# Patient Record
Sex: FEMALE | HISPANIC OR LATINO | Employment: OTHER | URBAN - METROPOLITAN AREA
[De-identification: names, ages, dates, MRNs, and addresses within clinical notes are randomized per-mention and may not be internally consistent; named-entity substitution may affect disease eponyms.]

---

## 2017-11-01 ENCOUNTER — RECORDS - HEALTHEAST (OUTPATIENT)
Dept: LAB | Facility: CLINIC | Age: 82
End: 2017-11-01

## 2017-11-01 LAB
CHOLEST SERPL-MCNC: 179 MG/DL
FASTING STATUS PATIENT QL REPORTED: ABNORMAL
HDLC SERPL-MCNC: 57 MG/DL
LDLC SERPL CALC-MCNC: 88 MG/DL
TRIGL SERPL-MCNC: 168 MG/DL

## 2019-01-07 ENCOUNTER — RECORDS - HEALTHEAST (OUTPATIENT)
Dept: LAB | Facility: CLINIC | Age: 84
End: 2019-01-07

## 2019-01-07 LAB
ANION GAP SERPL CALCULATED.3IONS-SCNC: 13 MMOL/L (ref 5–18)
BUN SERPL-MCNC: 23 MG/DL (ref 8–28)
CALCIUM SERPL-MCNC: 9.9 MG/DL (ref 8.5–10.5)
CHLORIDE BLD-SCNC: 99 MMOL/L (ref 98–107)
CO2 SERPL-SCNC: 23 MMOL/L (ref 22–31)
CREAT SERPL-MCNC: 1.08 MG/DL (ref 0.6–1.1)
GFR SERPL CREATININE-BSD FRML MDRD: 48 ML/MIN/1.73M2
GLUCOSE BLD-MCNC: 101 MG/DL (ref 70–125)
POTASSIUM BLD-SCNC: 4.3 MMOL/L (ref 3.5–5)
SODIUM SERPL-SCNC: 135 MMOL/L (ref 136–145)
TSH SERPL DL<=0.005 MIU/L-ACNC: 0.78 UIU/ML (ref 0.3–5)

## 2019-06-21 ENCOUNTER — RECORDS - HEALTHEAST (OUTPATIENT)
Dept: LAB | Facility: CLINIC | Age: 84
End: 2019-06-21

## 2019-06-21 LAB
ANION GAP SERPL CALCULATED.3IONS-SCNC: 12 MMOL/L (ref 5–18)
BUN SERPL-MCNC: 19 MG/DL (ref 8–28)
CALCIUM SERPL-MCNC: 10.1 MG/DL (ref 8.5–10.5)
CHLORIDE BLD-SCNC: 100 MMOL/L (ref 98–107)
CO2 SERPL-SCNC: 22 MMOL/L (ref 22–31)
CREAT SERPL-MCNC: 0.95 MG/DL (ref 0.6–1.1)
ERYTHROCYTE [SEDIMENTATION RATE] IN BLOOD BY WESTERGREN METHOD: 42 MM/HR (ref 0–20)
GFR SERPL CREATININE-BSD FRML MDRD: 55 ML/MIN/1.73M2
GLUCOSE BLD-MCNC: 100 MG/DL (ref 70–125)
POTASSIUM BLD-SCNC: 4.8 MMOL/L (ref 3.5–5)
SODIUM SERPL-SCNC: 134 MMOL/L (ref 136–145)

## 2020-06-05 ENCOUNTER — RECORDS - HEALTHEAST (OUTPATIENT)
Dept: LAB | Facility: CLINIC | Age: 85
End: 2020-06-05

## 2020-06-05 LAB
ANION GAP SERPL CALCULATED.3IONS-SCNC: 10 MMOL/L (ref 5–18)
BUN SERPL-MCNC: 22 MG/DL (ref 8–28)
CALCIUM SERPL-MCNC: 9.6 MG/DL (ref 8.5–10.5)
CHLORIDE BLD-SCNC: 100 MMOL/L (ref 98–107)
CHOLEST SERPL-MCNC: 199 MG/DL
CO2 SERPL-SCNC: 24 MMOL/L (ref 22–31)
CREAT SERPL-MCNC: 1.22 MG/DL (ref 0.6–1.1)
FASTING STATUS PATIENT QL REPORTED: ABNORMAL
GFR SERPL CREATININE-BSD FRML MDRD: 41 ML/MIN/1.73M2
GLUCOSE BLD-MCNC: 122 MG/DL (ref 70–125)
HDLC SERPL-MCNC: 54 MG/DL
LDLC SERPL CALC-MCNC: 110 MG/DL
POTASSIUM BLD-SCNC: 4.5 MMOL/L (ref 3.5–5)
SODIUM SERPL-SCNC: 134 MMOL/L (ref 136–145)
TRIGL SERPL-MCNC: 176 MG/DL

## 2021-05-03 ENCOUNTER — RECORDS - HEALTHEAST (OUTPATIENT)
Dept: LAB | Facility: CLINIC | Age: 86
End: 2021-05-03

## 2021-05-03 LAB
ANION GAP SERPL CALCULATED.3IONS-SCNC: 11 MMOL/L (ref 5–18)
BUN SERPL-MCNC: 22 MG/DL (ref 8–28)
CALCIUM SERPL-MCNC: 8.9 MG/DL (ref 8.5–10.5)
CHLORIDE BLD-SCNC: 101 MMOL/L (ref 98–107)
CHOLEST SERPL-MCNC: 184 MG/DL
CO2 SERPL-SCNC: 22 MMOL/L (ref 22–31)
CREAT SERPL-MCNC: 1.22 MG/DL (ref 0.6–1.1)
FASTING STATUS PATIENT QL REPORTED: ABNORMAL
GFR SERPL CREATININE-BSD FRML MDRD: 41 ML/MIN/1.73M2
GLUCOSE BLD-MCNC: 97 MG/DL (ref 70–125)
HDLC SERPL-MCNC: 51 MG/DL
LDLC SERPL CALC-MCNC: 99 MG/DL
POTASSIUM BLD-SCNC: 4.4 MMOL/L (ref 3.5–5)
SODIUM SERPL-SCNC: 134 MMOL/L (ref 136–145)
TRIGL SERPL-MCNC: 171 MG/DL
TSH SERPL DL<=0.005 MIU/L-ACNC: 0.72 UIU/ML (ref 0.3–5)

## 2021-05-26 ENCOUNTER — RECORDS - HEALTHEAST (OUTPATIENT)
Dept: ADMINISTRATIVE | Facility: CLINIC | Age: 86
End: 2021-05-26

## 2021-06-05 ENCOUNTER — RECORDS - HEALTHEAST (OUTPATIENT)
Dept: SCHEDULING | Facility: CLINIC | Age: 86
End: 2021-06-05

## 2021-06-05 DIAGNOSIS — R13.10 DYSPHAGIA: ICD-10-CM

## 2022-05-05 ENCOUNTER — LAB REQUISITION (OUTPATIENT)
Dept: LAB | Facility: CLINIC | Age: 87
End: 2022-05-05
Payer: MEDICARE

## 2022-05-05 DIAGNOSIS — E78.1 PURE HYPERGLYCERIDEMIA: ICD-10-CM

## 2022-05-05 DIAGNOSIS — I12.9 HYPERTENSIVE CHRONIC KIDNEY DISEASE WITH STAGE 1 THROUGH STAGE 4 CHRONIC KIDNEY DISEASE, OR UNSPECIFIED CHRONIC KIDNEY DISEASE: ICD-10-CM

## 2022-05-05 LAB
ANION GAP SERPL CALCULATED.3IONS-SCNC: 13 MMOL/L (ref 5–18)
BUN SERPL-MCNC: 18 MG/DL (ref 8–28)
CALCIUM SERPL-MCNC: 9.9 MG/DL (ref 8.5–10.5)
CHLORIDE BLD-SCNC: 98 MMOL/L (ref 98–107)
CHOLEST SERPL-MCNC: 189 MG/DL
CO2 SERPL-SCNC: 23 MMOL/L (ref 22–31)
CREAT SERPL-MCNC: 1.43 MG/DL (ref 0.6–1.1)
GFR SERPL CREATININE-BSD FRML MDRD: 34 ML/MIN/1.73M2
GLUCOSE BLD-MCNC: 78 MG/DL (ref 70–125)
HDLC SERPL-MCNC: 57 MG/DL
LDLC SERPL CALC-MCNC: 103 MG/DL
POTASSIUM BLD-SCNC: 4.9 MMOL/L (ref 3.5–5)
SODIUM SERPL-SCNC: 134 MMOL/L (ref 136–145)
TRIGL SERPL-MCNC: 147 MG/DL

## 2022-05-05 PROCEDURE — 80061 LIPID PANEL: CPT | Mod: ORL | Performed by: FAMILY MEDICINE

## 2022-05-05 PROCEDURE — 80048 BASIC METABOLIC PNL TOTAL CA: CPT | Mod: ORL | Performed by: FAMILY MEDICINE

## 2022-11-16 ENCOUNTER — TRANSFERRED RECORDS (OUTPATIENT)
Dept: HEALTH INFORMATION MANAGEMENT | Facility: CLINIC | Age: 87
End: 2022-11-16

## 2022-11-17 ENCOUNTER — TRANSCRIBE ORDERS (OUTPATIENT)
Dept: OTHER | Age: 87
End: 2022-11-17

## 2022-11-17 DIAGNOSIS — H57.10 EYE PAIN: Primary | ICD-10-CM

## 2022-11-17 DIAGNOSIS — H52.4 PRESBYOPIA: ICD-10-CM

## 2022-12-08 ENCOUNTER — LAB REQUISITION (OUTPATIENT)
Dept: LAB | Facility: CLINIC | Age: 87
End: 2022-12-08
Payer: MEDICARE

## 2022-12-08 DIAGNOSIS — I12.9 HYPERTENSIVE CHRONIC KIDNEY DISEASE WITH STAGE 1 THROUGH STAGE 4 CHRONIC KIDNEY DISEASE, OR UNSPECIFIED CHRONIC KIDNEY DISEASE: ICD-10-CM

## 2022-12-08 LAB
ANION GAP SERPL CALCULATED.3IONS-SCNC: 18 MMOL/L (ref 7–15)
BUN SERPL-MCNC: 28.6 MG/DL (ref 8–23)
CALCIUM SERPL-MCNC: 9.8 MG/DL (ref 8.2–9.6)
CHLORIDE SERPL-SCNC: 101 MMOL/L (ref 98–107)
CREAT SERPL-MCNC: 1.42 MG/DL (ref 0.51–0.95)
DEPRECATED HCO3 PLAS-SCNC: 21 MMOL/L (ref 22–29)
GFR SERPL CREATININE-BSD FRML MDRD: 34 ML/MIN/1.73M2
GLUCOSE SERPL-MCNC: 111 MG/DL (ref 70–99)
POTASSIUM SERPL-SCNC: 4.3 MMOL/L (ref 3.4–5.3)
SODIUM SERPL-SCNC: 140 MMOL/L (ref 136–145)

## 2022-12-08 PROCEDURE — 80048 BASIC METABOLIC PNL TOTAL CA: CPT | Mod: ORL | Performed by: FAMILY MEDICINE

## 2023-01-03 ENCOUNTER — ANESTHESIA EVENT (OUTPATIENT)
Dept: SURGERY | Facility: CLINIC | Age: 88
End: 2023-01-03
Payer: MEDICARE

## 2023-01-04 ENCOUNTER — ANESTHESIA (OUTPATIENT)
Dept: SURGERY | Facility: CLINIC | Age: 88
End: 2023-01-04
Payer: MEDICARE

## 2023-01-04 ENCOUNTER — HOSPITAL ENCOUNTER (OUTPATIENT)
Facility: CLINIC | Age: 88
Discharge: HOME | End: 2023-01-04
Attending: INTERNAL MEDICINE | Admitting: INTERNAL MEDICINE
Payer: MEDICARE

## 2023-01-04 VITALS
TEMPERATURE: 98.6 F | RESPIRATION RATE: 14 BRPM | HEIGHT: 55 IN | OXYGEN SATURATION: 99 % | SYSTOLIC BLOOD PRESSURE: 138 MMHG | DIASTOLIC BLOOD PRESSURE: 61 MMHG | HEART RATE: 63 BPM | WEIGHT: 90 LBS | BODY MASS INDEX: 20.83 KG/M2

## 2023-01-04 LAB — UPPER GI ENDOSCOPY: NORMAL

## 2023-01-04 PROCEDURE — 999N000141 HC STATISTIC PRE-PROCEDURE NURSING ASSESSMENT: Performed by: INTERNAL MEDICINE

## 2023-01-04 PROCEDURE — 272N000001 HC OR GENERAL SUPPLY STERILE: Performed by: INTERNAL MEDICINE

## 2023-01-04 PROCEDURE — 250N000011 HC RX IP 250 OP 636: Performed by: NURSE ANESTHETIST, CERTIFIED REGISTERED

## 2023-01-04 PROCEDURE — 710N000012 HC RECOVERY PHASE 2, PER MINUTE: Performed by: INTERNAL MEDICINE

## 2023-01-04 PROCEDURE — 360N000075 HC SURGERY LEVEL 2, PER MIN: Performed by: INTERNAL MEDICINE

## 2023-01-04 PROCEDURE — 258N000003 HC RX IP 258 OP 636

## 2023-01-04 PROCEDURE — 250N000009 HC RX 250: Performed by: NURSE ANESTHETIST, CERTIFIED REGISTERED

## 2023-01-04 PROCEDURE — 370N000017 HC ANESTHESIA TECHNICAL FEE, PER MIN: Performed by: INTERNAL MEDICINE

## 2023-01-04 PROCEDURE — 250N000020 HC RX IP 250 OP 636 J0585: Performed by: INTERNAL MEDICINE

## 2023-01-04 RX ORDER — LIDOCAINE HYDROCHLORIDE 10 MG/ML
INJECTION, SOLUTION INFILTRATION; PERINEURAL PRN
Status: DISCONTINUED | OUTPATIENT
Start: 2023-01-04 | End: 2023-01-04

## 2023-01-04 RX ORDER — SODIUM CHLORIDE, SODIUM LACTATE, POTASSIUM CHLORIDE, CALCIUM CHLORIDE 600; 310; 30; 20 MG/100ML; MG/100ML; MG/100ML; MG/100ML
INJECTION, SOLUTION INTRAVENOUS CONTINUOUS
Status: DISCONTINUED | OUTPATIENT
Start: 2023-01-04 | End: 2023-01-04 | Stop reason: HOSPADM

## 2023-01-04 RX ORDER — PROPOFOL 10 MG/ML
INJECTION, EMULSION INTRAVENOUS PRN
Status: DISCONTINUED | OUTPATIENT
Start: 2023-01-04 | End: 2023-01-04

## 2023-01-04 RX ORDER — FENTANYL CITRATE 50 UG/ML
25 INJECTION, SOLUTION INTRAMUSCULAR; INTRAVENOUS EVERY 5 MIN PRN
Status: DISCONTINUED | OUTPATIENT
Start: 2023-01-04 | End: 2023-01-04 | Stop reason: HOSPADM

## 2023-01-04 RX ORDER — FENTANYL CITRATE 50 UG/ML
25 INJECTION, SOLUTION INTRAMUSCULAR; INTRAVENOUS
Status: DISCONTINUED | OUTPATIENT
Start: 2023-01-04 | End: 2023-01-04 | Stop reason: HOSPADM

## 2023-01-04 RX ORDER — ONDANSETRON 4 MG/1
4 TABLET, ORALLY DISINTEGRATING ORAL EVERY 30 MIN PRN
Status: DISCONTINUED | OUTPATIENT
Start: 2023-01-04 | End: 2023-01-04 | Stop reason: HOSPADM

## 2023-01-04 RX ORDER — ONDANSETRON 2 MG/ML
4 INJECTION INTRAMUSCULAR; INTRAVENOUS EVERY 30 MIN PRN
Status: DISCONTINUED | OUTPATIENT
Start: 2023-01-04 | End: 2023-01-04 | Stop reason: HOSPADM

## 2023-01-04 RX ORDER — PROPOFOL 10 MG/ML
INJECTION, EMULSION INTRAVENOUS CONTINUOUS PRN
Status: DISCONTINUED | OUTPATIENT
Start: 2023-01-04 | End: 2023-01-04

## 2023-01-04 RX ORDER — HYDROMORPHONE HCL IN WATER/PF 6 MG/30 ML
0.2 PATIENT CONTROLLED ANALGESIA SYRINGE INTRAVENOUS EVERY 5 MIN PRN
Status: DISCONTINUED | OUTPATIENT
Start: 2023-01-04 | End: 2023-01-04 | Stop reason: HOSPADM

## 2023-01-04 RX ORDER — HYDROMORPHONE HCL IN WATER/PF 6 MG/30 ML
0.4 PATIENT CONTROLLED ANALGESIA SYRINGE INTRAVENOUS EVERY 5 MIN PRN
Status: DISCONTINUED | OUTPATIENT
Start: 2023-01-04 | End: 2023-01-04 | Stop reason: HOSPADM

## 2023-01-04 RX ORDER — GLYCOPYRROLATE 0.2 MG/ML
INJECTION, SOLUTION INTRAMUSCULAR; INTRAVENOUS PRN
Status: DISCONTINUED | OUTPATIENT
Start: 2023-01-04 | End: 2023-01-04

## 2023-01-04 RX ORDER — LIDOCAINE 40 MG/G
CREAM TOPICAL
Status: DISCONTINUED | OUTPATIENT
Start: 2023-01-04 | End: 2023-01-04 | Stop reason: HOSPADM

## 2023-01-04 RX ORDER — MEPERIDINE HYDROCHLORIDE 25 MG/ML
12.5 INJECTION INTRAMUSCULAR; INTRAVENOUS; SUBCUTANEOUS
Status: DISCONTINUED | OUTPATIENT
Start: 2023-01-04 | End: 2023-01-04 | Stop reason: HOSPADM

## 2023-01-04 RX ORDER — FENTANYL CITRATE 50 UG/ML
50 INJECTION, SOLUTION INTRAMUSCULAR; INTRAVENOUS EVERY 5 MIN PRN
Status: DISCONTINUED | OUTPATIENT
Start: 2023-01-04 | End: 2023-01-04 | Stop reason: HOSPADM

## 2023-01-04 RX ADMIN — PROPOFOL 150 MCG/KG/MIN: 10 INJECTION, EMULSION INTRAVENOUS at 11:20

## 2023-01-04 RX ADMIN — GLYCOPYRROLATE 0.2 MG: 0.2 INJECTION, SOLUTION INTRAMUSCULAR; INTRAVENOUS at 11:20

## 2023-01-04 RX ADMIN — PROPOFOL 20 MG: 10 INJECTION, EMULSION INTRAVENOUS at 11:20

## 2023-01-04 RX ADMIN — LIDOCAINE HYDROCHLORIDE 2 ML: 10 INJECTION, SOLUTION INFILTRATION; PERINEURAL at 11:18

## 2023-01-04 RX ADMIN — SODIUM CHLORIDE, POTASSIUM CHLORIDE, SODIUM LACTATE AND CALCIUM CHLORIDE: 600; 310; 30; 20 INJECTION, SOLUTION INTRAVENOUS at 10:44

## 2023-01-04 RX ADMIN — PROPOFOL 20 MG: 10 INJECTION, EMULSION INTRAVENOUS at 11:24

## 2023-01-04 RX ADMIN — PROPOFOL 20 MG: 10 INJECTION, EMULSION INTRAVENOUS at 11:22

## 2023-01-04 ASSESSMENT — ACTIVITIES OF DAILY LIVING (ADL): ADLS_ACUITY_SCORE: 37

## 2023-01-04 NOTE — ANESTHESIA PREPROCEDURE EVALUATION
Anesthesia Pre-Procedure Evaluation    Patient: Kim Dunn   MRN: [id number] : 1928        Procedure : Procedure(s):  ESOPHAGOGASTRODUODENOSCOPY WITH BOTOX          Past Medical History:   Diagnosis Date     Gastroesophageal reflux disease      Hypertension       Past Surgical History:   Procedure Laterality Date     ABDOMINAL HYSTERECTOMY       CHOLECYSTECTOMY       ESOPHAGOSCOPY, GASTROSCOPY, DUODENOSCOPY (EGD), COMBINED N/A 4/3/2015    Procedure: UPPER ENDOSCOPY WITH injection of BOTOX;  Surgeon: Loi Alberto MD;  Location: [facility];  Service:       Allergies   Allergen Reactions     Penicillins Hives     Seafood       Social History     Tobacco Use     Smoking status: Never     Smokeless tobacco: Not on file   Substance Use Topics     Alcohol use: No      Wt Readings from Last 1 Encounters:   23 40.8 kg (90 lb)        Anesthesia Evaluation   Pt has had prior anesthetic.     No history of anesthetic complications       ROS/MED HX  ENT/Pulmonary:  - neg pulmonary ROS     Neurologic:  - neg neurologic ROS     Cardiovascular: Comment: Mild AV sclerosis, no hemodynamic changes; EF 69%    (+) hypertension-----    METS/Exercise Tolerance:     Hematologic:       Musculoskeletal: Comment: PMR      GI/Hepatic:     (+) GERD (cough, achalasia),     Renal/Genitourinary:     (+) renal disease, type: CRI,     Endo:  - neg endo ROS     Psychiatric/Substance Use:  - neg psychiatric ROS     Infectious Disease:  - neg infectious disease ROS     Malignancy:       Other:            Physical Exam    Airway        Mallampati: II   TM distance: > 3 FB   Neck ROM: full   Mouth opening: > 3 cm    Respiratory Devices and Support         Dental  no notable dental history         Cardiovascular          Rhythm and rate: regular and normal     Pulmonary           breath sounds clear to auscultation           OUTSIDE LABS:  CBC: No results found for: WBC, HGB, HCT, PLT  BMP:   Lab Results   Component Value Date      12/08/2022     (L) 05/05/2022    POTASSIUM 4.3 12/08/2022    POTASSIUM 4.9 05/05/2022    CHLORIDE 101 12/08/2022    CHLORIDE 98 05/05/2022    CO2 21 (L) 12/08/2022    CO2 23 05/05/2022    BUN 28.6 (H) 12/08/2022    BUN 18 05/05/2022    CR 1.42 (H) 12/08/2022    CR 1.43 (H) 05/05/2022     (H) 12/08/2022    GLC 78 05/05/2022     COAGS: No results found for: PTT, INR, FIBR  POC: No results found for: BGM, HCG, HCGS  HEPATIC: No results found for: ALBUMIN, PROTTOTAL, ALT, AST, GGT, ALKPHOS, BILITOTAL, BILIDIRECT, PRUDENCIO  OTHER:   Lab Results   Component Value Date    NEHA 9.8 (H) 12/08/2022    TSH 0.72 05/03/2021    SED 42 (H) 06/21/2019       Anesthesia Plan    ASA Status:  3      Anesthesia Type: MAC.              Consents    Anesthesia Plan(s) and associated risks, benefits, and realistic alternatives discussed. Questions answered and patient/representative(s) expressed understanding.     - Discussed: Risks, Benefits and Alternatives for BOTH SEDATION and the PROCEDURE were discussed     - Discussed with:  Patient         Postoperative Care            Comments:                Sue Lyons MD

## 2023-01-04 NOTE — ANESTHESIA POSTPROCEDURE EVALUATION
Patient: Kim Dunn    Procedure: Procedure(s):  ESOPHAGOGASTRODUODENOSCOPY WITH BOTOX       Anesthesia Type:  MAC    Note:  Disposition: Outpatient   Postop Pain Control: Uneventful            Sign Out: Well controlled pain   PONV: No   Neuro/Psych: Uneventful            Sign Out: Acceptable/Baseline neuro status   Airway/Respiratory: Uneventful            Sign Out: Acceptable/Baseline resp. status   CV/Hemodynamics: Uneventful            Sign Out: Acceptable CV status; No obvious hypovolemia; No obvious fluid overload   Other NRE: NONE   DID A NON-ROUTINE EVENT OCCUR? No           Last vitals:  Vitals Value Taken Time   /61 01/04/23 1215   Temp 37  C (98.6  F) 01/04/23 1215   Pulse     Resp 14 01/04/23 1215   SpO2 99 % 01/04/23 1215       Electronically Signed By: Myles Silvestre MD  January 4, 2023  3:21 PM   patient

## 2023-01-04 NOTE — ANESTHESIA CARE TRANSFER NOTE
Patient: Kim Dunn    Procedure: Procedure(s):  ESOPHAGOGASTRODUODENOSCOPY WITH BOTOX       Diagnosis: Achalasia [K22.0]  Diagnosis Additional Information: No value filed.    Anesthesia Type:   MAC     Note:    Oropharynx: oropharynx clear of all foreign objects  Level of Consciousness: awake  Oxygen Supplementation: face mask  Level of Supplemental Oxygen (L/min / FiO2): 8  Independent Airway: airway patency satisfactory and stable  Dentition: dentition unchanged  Vital Signs Stable: post-procedure vital signs reviewed and stable  Report to RN Given: handoff report given  Patient transferred to: Phase II    Handoff Report: Identifed the Patient, Identified the Reponsible Provider, Reviewed the pertinent medical history, Discussed the surgical course, Reviewed Intra-OP anesthesia mangement and issues during anesthesia, Set expectations for post-procedure period and Allowed opportunity for questions and acknowledgement of understanding      Vitals:  Vitals Value Taken Time   BP 94/46 01/04/23 1134   Temp 36.1  C (96.9  F) 01/04/23 1134   Pulse 62    Resp 16 01/04/23 1134   SpO2 99 % 01/04/23 1134       Electronically Signed By: ROCHELLE Saldivar CRNA  January 4, 2023  11:37 AM

## 2023-01-12 ENCOUNTER — TELEPHONE (OUTPATIENT)
Dept: OPHTHALMOLOGY | Facility: CLINIC | Age: 88
End: 2023-01-12

## 2023-01-12 NOTE — TELEPHONE ENCOUNTER
Spoke with pt's grandson to reschedule appointment with Dr. Moctezuma. Confirmed new date/time    Monika Marley on 1/12/2023 at 11:24 AM

## 2023-03-08 PROBLEM — R09.89 BRUIT OF RIGHT CAROTID ARTERY: Status: ACTIVE | Noted: 2022-12-14

## 2023-03-08 PROBLEM — I10 PRIMARY HYPERTENSION: Status: ACTIVE | Noted: 2022-12-14

## 2023-03-08 PROBLEM — R29.6 FALLS: Status: ACTIVE | Noted: 2022-12-14

## 2023-03-08 PROBLEM — I45.10 RBBB: Status: ACTIVE | Noted: 2022-12-14

## 2023-03-08 PROBLEM — I35.8 AORTIC VALVE SCLEROSIS: Status: ACTIVE | Noted: 2022-12-14

## 2023-03-09 ENCOUNTER — OFFICE VISIT (OUTPATIENT)
Dept: OPHTHALMOLOGY | Facility: CLINIC | Age: 88
End: 2023-03-09
Attending: OPTOMETRIST
Payer: MEDICARE

## 2023-03-09 DIAGNOSIS — M79.2 NEUROPATHIC PAIN: ICD-10-CM

## 2023-03-09 DIAGNOSIS — H52.4 PRESBYOPIA: ICD-10-CM

## 2023-03-09 DIAGNOSIS — H40.003 GLAUCOMA SUSPECT OF BOTH EYES: Primary | ICD-10-CM

## 2023-03-09 PROCEDURE — 92015 DETERMINE REFRACTIVE STATE: CPT

## 2023-03-09 PROCEDURE — G0463 HOSPITAL OUTPT CLINIC VISIT: HCPCS | Mod: 25

## 2023-03-09 PROCEDURE — 92133 CPTRZD OPH DX IMG PST SGM ON: CPT | Performed by: OPHTHALMOLOGY

## 2023-03-09 PROCEDURE — 99203 OFFICE O/P NEW LOW 30 MIN: CPT | Mod: GC | Performed by: OPHTHALMOLOGY

## 2023-03-09 RX ORDER — GABAPENTIN 100 MG/1
100 CAPSULE ORAL 3 TIMES DAILY
Qty: 90 CAPSULE | Refills: 3 | Status: SHIPPED | OUTPATIENT
Start: 2023-03-09 | End: 2024-02-21

## 2023-03-09 ASSESSMENT — VISUAL ACUITY
OS_PH_SC: 20/40
OS_SC: 20/70
OS_SC+: -2
METHOD: SNELLEN - LINEAR
OD_SC: 20/70
OD_PH_SC: 20/40

## 2023-03-09 ASSESSMENT — EXTERNAL EXAM - LEFT EYE: OS_EXAM: NORMAL

## 2023-03-09 ASSESSMENT — EXTERNAL EXAM - RIGHT EYE: OD_EXAM: NORMAL

## 2023-03-09 ASSESSMENT — SLIT LAMP EXAM - LIDS
COMMENTS: 1+ MGD
COMMENTS: 1+ MGD

## 2023-03-09 ASSESSMENT — CONF VISUAL FIELD
OD_INFERIOR_TEMPORAL_RESTRICTION: 0
OD_SUPERIOR_NASAL_RESTRICTION: 0
OD_INFERIOR_NASAL_RESTRICTION: 0
OS_SUPERIOR_NASAL_RESTRICTION: 0
OS_INFERIOR_NASAL_RESTRICTION: 0
OS_INFERIOR_TEMPORAL_RESTRICTION: 0
OS_NORMAL: 1
OS_SUPERIOR_TEMPORAL_RESTRICTION: 0
OD_NORMAL: 1
OD_SUPERIOR_TEMPORAL_RESTRICTION: 0

## 2023-03-09 ASSESSMENT — REFRACTION_MANIFEST
OD_CYLINDER: +2.25
OS_AXIS: 172
OD_ADD: +2.75
OD_AXIS: 010
OS_CYLINDER: +2.75
OS_ADD: +2.75
OD_SPHERE: -0.50
OS_SPHERE: -1.75

## 2023-03-09 ASSESSMENT — CUP TO DISC RATIO
OS_RATIO: 0.5
OD_RATIO: 0.5

## 2023-03-09 ASSESSMENT — TONOMETRY
IOP_METHOD: ICARE
OD_IOP_MMHG: 16
OS_IOP_MMHG: 14

## 2023-03-09 NOTE — PROGRESS NOTES
"   1. Left sided trigeminal neuralgia: Patient complains of episodes of pain of the left eye radiating to the left side of the face.  This is triggered by applying pressure behind the left ear.    2. Glaucoma suspect (monitored): Inferior and nasal RNFL thinning left eye only although OCT is poor in the left eye due to poor segmentation. Follow-up glaucoma suspect status with primary eye care provider.  She is low risk for glaucoma.    Upon touch of skin adjacent to patient's left ear she experiences severe brief left facial pain.      Kim Dunn is a pleasant 89 year old female who presents to my neuro-ophthalmology clinic today having been referred by Dr. Young for left eye pain unresponsive to topical anesthetic.    Plan:  -Initiate gabapentin 100 mg: Titrate up to 3 tablets daily by increasing by 1 tablet daily.  -Referral to headache neurology.  -Follow-up with me as needed.  -Letter to PCP Dr. Darryn Lund at [facility]    HPI:    Patient reports she began having intermittent left eye pain beginning about 6 months ago.  She describes a \"sore, pressure\" pain that is most noticeable in the morning.  Throughout the day, she reports she is largely able to ignore the pain.  There is some relief of the pressure sensation with instillation of Celluvisc and a compress with a warm washcloth.  Additionally, she is using Theratears bid and SootheX bid.  She has previously used Restasis.    She denies redness, tearing/watering, photophobia.  She has some intermittent FBS of the left eye.  She feels the pain has not progressively worsened with time; frequency has not changed.  Occasionally, there is some radiating pain around her eye \"to the bone\" and alongside her face (gestures to V1/2).  The pain can be severe and brief and is exacerbated by touching her left neck posterior to the mandible.    Of note, she had an episode of shingles along her left ear about 15 years ago; she denies ocular involvement.  Her " grandson adds that she had lesions near her mouth.    Her ophthalmic history significant for dry eye, glaucoma suspect, and PCIOL OU.    Independent historians:  Patient and grandson    Review of outside testing:    No pertinent testing available for review      Review of outside clinical notes:    11/14/22 -- Visit with Dr. Young        Past medical history:    Patient Active Problem List   Diagnosis     Aortic valve sclerosis     Bruit of right carotid artery     Falls     Achalasia of esophagus     Primary hypertension     RBBB       Patient currently takes:   Omprazole, losartan, amlodipine    Family history / social history:  Patient's family history is remarkable for hypertension     Patient  reports that she has never smoked. She does not have any smokeless tobacco history on file. She reports that she does not drink alcohol and does not use drugs.     Exam:  Uncorrected distance visual acuity was 20/70 in the right eye and 20/70 -2 in the left eye. Pin hole to 20/40 in both eyes.  Intraocular pressure was 16 in the right eye and 14 in the left eye using ICare.  Color vision 11/11 right eye and 10/11 left eye.  Pupils isocoric with no APD.  Anterior segment exam revealed 2+ PEE inferiorly on the left eye.  Undilated fundus exam showed healthy appearing optic nerve heads bilaterally.    Patient has severe left sided facial lancing pain with touching the region of her neck just posterior to her mandible.    Tests ordered and interpreted today:  OCT RNFL:  Right eye: Average RNFL 99 microns.  Normal compared with age-matched norms.  Left eye: Average RNFL 74 microns.  Significant nasal and inferotemporal thinning (unreliable due to poor segmentation)       35 minutes were spent on the date of the encounter by me doing chart review, history and exam, documentation, and further activities as noted above    Complete documentation of historical and exam elements from today's encounter can be found in the full  encounter summary report (not reduplicated in this progress note).  I personally obtained the chief complaint(s) and history of present illness.  I confirmed and edited as necessary the review of systems, past medical/surgical history, family history, social history, and examination findings as documented by others; and I examined the patient myself.  I personally reviewed the relevant tests, images, and reports as documented above.  I formulated and edited as necessary the assessment and plan and discussed the findings and management plan with the patient and family.  I personally reviewed the ophthalmic test(s) associated with this encounter, agree with the interpretation(s) as documented by the resident/fellow, and have edited the corresponding report(s) as necessary.     Jacques Nayak MD    Precharting:  Chepe Moctezuma MD PhD  Fellow, Neuro-Ophthalmology    Flor Hopkins OD

## 2023-03-09 NOTE — LETTER
"2023    RE: Kim Dunn  : 1928  MRN: [id number]    Dear Dr. Young    Thank you for referring your patient, Kim Dunn, to my neuro-ophthalmology clinic recently.  After a thorough neuro-ophthalmic history and examination, I came to the following conclusions:     1. Left sided trigeminal neuralgia: Patient complains of episodes of pain of the left eye radiating to the left side of the face.  This is triggered by applying pressure behind the left ear.    2. Glaucoma suspect (monitored): Inferior and nasal RNFL thinning left eye only although OCT is poor in the left eye due to poor segmentation. Follow-up glaucoma suspect status with primary eye care provider.  She is low risk for glaucoma.    Upon touch of skin adjacent to patient's left ear she experiences severe brief left facial pain.      Kim Dunn is a pleasant 89 year old female who presents to my neuro-ophthalmology clinic today having been referred by Dr. Young for left eye pain unresponsive to topical anesthetic.    Plan:  -Initiate gabapentin 100 mg: Titrate up to 3 tablets daily by increasing by 1 tablet daily.  -Referral to headache neurology.  -Follow-up with me as needed.  -Letter to PCP Dr. Darryn Lund at [facility]    HPI:    Patient reports she began having intermittent left eye pain beginning about 6 months ago.  She describes a \"sore, pressure\" pain that is most noticeable in the morning.  Throughout the day, she reports she is largely able to ignore the pain.  There is some relief of the pressure sensation with instillation of Celluvisc and a compress with a warm washcloth.  Additionally, she is using Theratears bid and SootheX bid.  She has previously used Restasis.    She denies redness, tearing/watering, photophobia.  She has some intermittent FBS of the left eye.  She feels the pain has not progressively worsened with time; frequency has not changed.  Occasionally, there is some radiating pain around her eye \"to " "the bone\" and alongside her face (gestures to V1/2).  The pain can be severe and brief and is exacerbated by touching her left neck posterior to the mandible.  Of note, she had an episode of shingles along her left ear about 15 years ago; she denies ocular involvement.  Her grandson adds that she had lesions near her mouth.    Her ophthalmic history significant for dry eye, glaucoma suspect, and PCIOL OU.    Independent historians:  Patient and grandson    Review of outside testing:    No pertinent testing available for review    Review of outside clinical notes:    11/14/22 -- Visit with Dr. Young        Past medical history:    Patient Active Problem List   Diagnosis     Aortic valve sclerosis     Bruit of right carotid artery     Falls     Achalasia of esophagus     Primary hypertension     RBBB       Patient currently takes:   Omprazole, losartan, amlodipine    Family history / social history:  Patient's family history is remarkable for hypertension.     Patient  reports that she has never smoked. She does not have any smokeless tobacco history on file. She reports that she does not drink alcohol and does not use drugs.     Exam:  Uncorrected distance visual acuity was 20/70 in the right eye and 20/70 -2 in the left eye. Pin hole to 20/40 in both eyes.  Intraocular pressure was 16 in the right eye and 14 in the left eye using ICare.  Color vision 11/11 right eye and 10/11 left eye.  Pupils isocoric with no APD.  Anterior segment exam revealed 2+ PEE inferiorly on the left eye.  Undilated fundus exam showed healthy appearing optic nerve heads bilaterally.    Patient has severe left sided facial lancing pain with touching the region of her neck just posterior to her mandible.    Tests ordered and interpreted today:  OCT RNFL:  Right eye: Average RNFL 99 microns.  Normal compared with age-matched norms.  Left eye: Average RNFL 74 microns.  Significant nasal and inferotemporal thinning (unreliable due to poor " segmentation)      Again, thank you for trusting me with the care of your patient.  For further exam details, please feel free to contact our office for additional records.  If you wish to contact me regarding this patient please email me at [email or web address] or give my clinic a call to arrange a phone conversation.    Sincerely,    Jacques Nayak MD  , Neuro-Ophthalmology and Adult Strabismus Surgery  [facility] [employer] [facility] in Neuro-Ophthalmology  Department of Ophthalmology and Visual Neurosciences  [facility]    DX: trigeminal neuralgia, glaucoma suspect

## 2023-03-09 NOTE — NURSING NOTE
Chief Complaints and History of Present Illnesses   Patient presents with     Eye Pain     Chief Complaint(s) and History of Present Illness(es)     Eye Pain           Comments    Patient states that she has pain in the LE. Patient states that she is getting a sharp pain in the LE for several months. Patient states that 8-9/10.  She states that her eye is very sore today. No vision concerns. No blurry vision. No flashes of light. No floaters. She states that her LE is just in pain.     Ocular Meds:artifical tears  restasis    Farhan LANDEROS, March 9, 2023 12:40 PM

## 2023-04-05 ENCOUNTER — TELEPHONE (OUTPATIENT)
Dept: OPHTHALMOLOGY | Facility: CLINIC | Age: 88
End: 2023-04-05
Payer: COMMERCIAL

## 2023-04-05 NOTE — TELEPHONE ENCOUNTER
I spoke with patient's grandson and offered phone number for them to schedule with Neurology @ [phone].  He accepted phone number and said he will discuss further follow up on head pain with patient's extended family and decide on scheduling further appointment.    LYSSA King 4/5/2023 3:31 PM'

## 2023-05-05 ENCOUNTER — LAB REQUISITION (OUTPATIENT)
Dept: LAB | Facility: CLINIC | Age: 88
End: 2023-05-05
Payer: MEDICARE

## 2023-05-05 DIAGNOSIS — E78.1 PURE HYPERGLYCERIDEMIA: ICD-10-CM

## 2023-05-05 DIAGNOSIS — N18.32 CHRONIC KIDNEY DISEASE, STAGE 3B (H): ICD-10-CM

## 2023-05-05 LAB
ANION GAP SERPL CALCULATED.3IONS-SCNC: 13 MMOL/L (ref 7–15)
BUN SERPL-MCNC: 31.7 MG/DL (ref 8–23)
CALCIUM SERPL-MCNC: 10.6 MG/DL (ref 8.2–9.6)
CHLORIDE SERPL-SCNC: 97 MMOL/L (ref 98–107)
CHOLEST SERPL-MCNC: 149 MG/DL
CREAT SERPL-MCNC: 1.41 MG/DL (ref 0.51–0.95)
DEPRECATED HCO3 PLAS-SCNC: 25 MMOL/L (ref 22–29)
GFR SERPL CREATININE-BSD FRML MDRD: 34 ML/MIN/1.73M2
GLUCOSE SERPL-MCNC: 108 MG/DL (ref 70–99)
HDLC SERPL-MCNC: 48 MG/DL
LDLC SERPL CALC-MCNC: 68 MG/DL
NONHDLC SERPL-MCNC: 101 MG/DL
POTASSIUM SERPL-SCNC: 4.3 MMOL/L (ref 3.4–5.3)
SODIUM SERPL-SCNC: 135 MMOL/L (ref 136–145)
TRIGL SERPL-MCNC: 166 MG/DL

## 2023-05-05 PROCEDURE — 80048 BASIC METABOLIC PNL TOTAL CA: CPT | Mod: ORL | Performed by: FAMILY MEDICINE

## 2023-05-05 PROCEDURE — 80061 LIPID PANEL: CPT | Mod: ORL | Performed by: FAMILY MEDICINE

## 2024-02-20 ENCOUNTER — HOSPITAL ENCOUNTER (INPATIENT)
Facility: CLINIC | Age: 89
LOS: 5 days | Discharge: HOSPICE | DRG: 177 | End: 2024-02-27
Attending: EMERGENCY MEDICINE | Admitting: HOSPITALIST
Payer: MEDICARE

## 2024-02-20 DIAGNOSIS — R53.1 GENERALIZED WEAKNESS: ICD-10-CM

## 2024-02-20 DIAGNOSIS — T17.908A ASPIRATION INTO AIRWAY, INITIAL ENCOUNTER: Primary | ICD-10-CM

## 2024-02-20 DIAGNOSIS — K22.0 ACHALASIA: ICD-10-CM

## 2024-02-20 DIAGNOSIS — J69.0 ASPIRATION PNEUMONITIS (H): ICD-10-CM

## 2024-02-20 LAB
ALBUMIN SERPL BCG-MCNC: 3.9 G/DL (ref 3.5–5.2)
ALP SERPL-CCNC: 69 U/L (ref 40–150)
ALT SERPL W P-5'-P-CCNC: <5 U/L (ref 0–50)
ANION GAP SERPL CALCULATED.3IONS-SCNC: 12 MMOL/L (ref 7–15)
AST SERPL W P-5'-P-CCNC: 22 U/L (ref 0–45)
BASOPHILS # BLD AUTO: 0.1 10E3/UL (ref 0–0.2)
BASOPHILS NFR BLD AUTO: 1 %
BILIRUB DIRECT SERPL-MCNC: <0.2 MG/DL (ref 0–0.3)
BILIRUB SERPL-MCNC: 0.4 MG/DL
BUN SERPL-MCNC: 31.2 MG/DL (ref 8–23)
CALCIUM SERPL-MCNC: 10.9 MG/DL (ref 8.2–9.6)
CHLORIDE SERPL-SCNC: 104 MMOL/L (ref 98–107)
CREAT SERPL-MCNC: 0.99 MG/DL (ref 0.51–0.95)
DEPRECATED HCO3 PLAS-SCNC: 25 MMOL/L (ref 22–29)
EGFRCR SERPLBLD CKD-EPI 2021: 52 ML/MIN/1.73M2
EOSINOPHIL # BLD AUTO: 0.3 10E3/UL (ref 0–0.7)
EOSINOPHIL NFR BLD AUTO: 4 %
ERYTHROCYTE [DISTWIDTH] IN BLOOD BY AUTOMATED COUNT: 13.4 % (ref 10–15)
GLUCOSE SERPL-MCNC: 104 MG/DL (ref 70–99)
HCT VFR BLD AUTO: 37.1 % (ref 35–47)
HGB BLD-MCNC: 11.6 G/DL (ref 11.7–15.7)
IMM GRANULOCYTES # BLD: 0 10E3/UL
IMM GRANULOCYTES NFR BLD: 0 %
LYMPHOCYTES # BLD AUTO: 1.1 10E3/UL (ref 0.8–5.3)
LYMPHOCYTES NFR BLD AUTO: 15 %
MAGNESIUM SERPL-MCNC: 2 MG/DL (ref 1.7–2.3)
MCH RBC QN AUTO: 28.8 PG (ref 26.5–33)
MCHC RBC AUTO-ENTMCNC: 31.3 G/DL (ref 31.5–36.5)
MCV RBC AUTO: 92 FL (ref 78–100)
MONOCYTES # BLD AUTO: 0.5 10E3/UL (ref 0–1.3)
MONOCYTES NFR BLD AUTO: 7 %
NEUTROPHILS # BLD AUTO: 5 10E3/UL (ref 1.6–8.3)
NEUTROPHILS NFR BLD AUTO: 73 %
NRBC # BLD AUTO: 0 10E3/UL
NRBC BLD AUTO-RTO: 0 /100
PLATELET # BLD AUTO: 264 10E3/UL (ref 150–450)
POTASSIUM SERPL-SCNC: 4 MMOL/L (ref 3.4–5.3)
PROT SERPL-MCNC: 8.5 G/DL (ref 6.4–8.3)
RBC # BLD AUTO: 4.03 10E6/UL (ref 3.8–5.2)
SODIUM SERPL-SCNC: 141 MMOL/L (ref 135–145)
TROPONIN T SERPL HS-MCNC: 26 NG/L
WBC # BLD AUTO: 6.9 10E3/UL (ref 4–11)

## 2024-02-20 PROCEDURE — 258N000003 HC RX IP 258 OP 636: Performed by: EMERGENCY MEDICINE

## 2024-02-20 PROCEDURE — 83735 ASSAY OF MAGNESIUM: CPT | Performed by: EMERGENCY MEDICINE

## 2024-02-20 PROCEDURE — 96374 THER/PROPH/DIAG INJ IV PUSH: CPT | Mod: 59

## 2024-02-20 PROCEDURE — 85004 AUTOMATED DIFF WBC COUNT: CPT | Performed by: EMERGENCY MEDICINE

## 2024-02-20 PROCEDURE — 99285 EMERGENCY DEPT VISIT HI MDM: CPT | Mod: 25

## 2024-02-20 PROCEDURE — 250N000011 HC RX IP 250 OP 636: Performed by: EMERGENCY MEDICINE

## 2024-02-20 PROCEDURE — 84484 ASSAY OF TROPONIN QUANT: CPT | Performed by: EMERGENCY MEDICINE

## 2024-02-20 PROCEDURE — 36415 COLL VENOUS BLD VENIPUNCTURE: CPT | Performed by: EMERGENCY MEDICINE

## 2024-02-20 PROCEDURE — 82248 BILIRUBIN DIRECT: CPT | Performed by: EMERGENCY MEDICINE

## 2024-02-20 PROCEDURE — 80053 COMPREHEN METABOLIC PANEL: CPT | Performed by: EMERGENCY MEDICINE

## 2024-02-20 PROCEDURE — 96375 TX/PRO/DX INJ NEW DRUG ADDON: CPT

## 2024-02-20 PROCEDURE — 99223 1ST HOSP IP/OBS HIGH 75: CPT | Performed by: INTERNAL MEDICINE

## 2024-02-20 PROCEDURE — 83880 ASSAY OF NATRIURETIC PEPTIDE: CPT | Performed by: INTERNAL MEDICINE

## 2024-02-20 PROCEDURE — 96361 HYDRATE IV INFUSION ADD-ON: CPT

## 2024-02-20 PROCEDURE — 93005 ELECTROCARDIOGRAM TRACING: CPT | Performed by: EMERGENCY MEDICINE

## 2024-02-20 RX ORDER — ONDANSETRON 2 MG/ML
4 INJECTION INTRAMUSCULAR; INTRAVENOUS ONCE
Status: COMPLETED | OUTPATIENT
Start: 2024-02-20 | End: 2024-02-20

## 2024-02-20 RX ADMIN — SODIUM CHLORIDE 1000 ML: 9 INJECTION, SOLUTION INTRAVENOUS at 23:50

## 2024-02-20 RX ADMIN — ONDANSETRON 4 MG: 2 INJECTION INTRAMUSCULAR; INTRAVENOUS at 23:51

## 2024-02-21 ENCOUNTER — APPOINTMENT (OUTPATIENT)
Dept: SPEECH THERAPY | Facility: CLINIC | Age: 89
DRG: 177 | End: 2024-02-21
Attending: INTERNAL MEDICINE
Payer: MEDICARE

## 2024-02-21 ENCOUNTER — APPOINTMENT (OUTPATIENT)
Dept: CT IMAGING | Facility: CLINIC | Age: 89
DRG: 177 | End: 2024-02-21
Attending: EMERGENCY MEDICINE
Payer: MEDICARE

## 2024-02-21 ENCOUNTER — APPOINTMENT (OUTPATIENT)
Dept: RADIOLOGY | Facility: CLINIC | Age: 89
DRG: 177 | End: 2024-02-21
Attending: EMERGENCY MEDICINE
Payer: MEDICARE

## 2024-02-21 ENCOUNTER — APPOINTMENT (OUTPATIENT)
Dept: CARDIOLOGY | Facility: CLINIC | Age: 89
DRG: 177 | End: 2024-02-21
Attending: INTERNAL MEDICINE
Payer: MEDICARE

## 2024-02-21 PROBLEM — T17.908A ASPIRATION INTO AIRWAY: Status: ACTIVE | Noted: 2024-02-21

## 2024-02-21 PROBLEM — R53.1 GENERALIZED WEAKNESS: Status: ACTIVE | Noted: 2024-02-21

## 2024-02-21 PROBLEM — K22.0 ACHALASIA: Status: ACTIVE | Noted: 2024-02-21

## 2024-02-21 LAB
ALBUMIN SERPL BCG-MCNC: 3.3 G/DL (ref 3.5–5.2)
ALP SERPL-CCNC: 57 U/L (ref 40–150)
ALT SERPL W P-5'-P-CCNC: <5 U/L (ref 0–50)
ANION GAP SERPL CALCULATED.3IONS-SCNC: 10 MMOL/L (ref 7–15)
AST SERPL W P-5'-P-CCNC: 20 U/L (ref 0–45)
ATRIAL RATE - MUSE: 64 BPM
BASOPHILS # BLD AUTO: 0 10E3/UL (ref 0–0.2)
BASOPHILS NFR BLD AUTO: 1 %
BILIRUB SERPL-MCNC: 0.3 MG/DL
BUN SERPL-MCNC: 23.6 MG/DL (ref 8–23)
CALCIUM SERPL-MCNC: 9.3 MG/DL (ref 8.2–9.6)
CHLORIDE SERPL-SCNC: 105 MMOL/L (ref 98–107)
CREAT SERPL-MCNC: 0.79 MG/DL (ref 0.51–0.95)
DEPRECATED HCO3 PLAS-SCNC: 24 MMOL/L (ref 22–29)
DIASTOLIC BLOOD PRESSURE - MUSE: NORMAL MMHG
EGFRCR SERPLBLD CKD-EPI 2021: 68 ML/MIN/1.73M2
EOSINOPHIL # BLD AUTO: 0.3 10E3/UL (ref 0–0.7)
EOSINOPHIL NFR BLD AUTO: 4 %
ERYTHROCYTE [DISTWIDTH] IN BLOOD BY AUTOMATED COUNT: 13.5 % (ref 10–15)
FLUAV RNA SPEC QL NAA+PROBE: NEGATIVE
FLUBV RNA RESP QL NAA+PROBE: NEGATIVE
GLUCOSE SERPL-MCNC: 103 MG/DL (ref 70–99)
HCT VFR BLD AUTO: 32.6 % (ref 35–47)
HGB BLD-MCNC: 10.4 G/DL (ref 11.7–15.7)
IMM GRANULOCYTES # BLD: 0 10E3/UL
IMM GRANULOCYTES NFR BLD: 0 %
INTERPRETATION ECG - MUSE: NORMAL
LYMPHOCYTES # BLD AUTO: 0.7 10E3/UL (ref 0.8–5.3)
LYMPHOCYTES NFR BLD AUTO: 11 %
MCH RBC QN AUTO: 29.5 PG (ref 26.5–33)
MCHC RBC AUTO-ENTMCNC: 31.9 G/DL (ref 31.5–36.5)
MCV RBC AUTO: 92 FL (ref 78–100)
MONOCYTES # BLD AUTO: 0.6 10E3/UL (ref 0–1.3)
MONOCYTES NFR BLD AUTO: 9 %
NEUTROPHILS # BLD AUTO: 4.8 10E3/UL (ref 1.6–8.3)
NEUTROPHILS NFR BLD AUTO: 75 %
NRBC # BLD AUTO: 0 10E3/UL
NRBC BLD AUTO-RTO: 0 /100
NT-PROBNP SERPL-MCNC: 1098 PG/ML (ref 0–1800)
P AXIS - MUSE: NORMAL DEGREES
PLATELET # BLD AUTO: 236 10E3/UL (ref 150–450)
POTASSIUM SERPL-SCNC: 3.7 MMOL/L (ref 3.4–5.3)
PR INTERVAL - MUSE: 130 MS
PROCALCITONIN SERPL IA-MCNC: 0.05 NG/ML
PROT SERPL-MCNC: 7 G/DL (ref 6.4–8.3)
QRS DURATION - MUSE: 116 MS
QT - MUSE: 410 MS
QTC - MUSE: 422 MS
R AXIS - MUSE: 110 DEGREES
RBC # BLD AUTO: 3.53 10E6/UL (ref 3.8–5.2)
RSV RNA SPEC NAA+PROBE: NEGATIVE
SARS-COV-2 RNA RESP QL NAA+PROBE: NEGATIVE
SODIUM SERPL-SCNC: 139 MMOL/L (ref 135–145)
SYSTOLIC BLOOD PRESSURE - MUSE: NORMAL MMHG
T AXIS - MUSE: 176 DEGREES
TROPONIN T SERPL HS-MCNC: 24 NG/L
TROPONIN T SERPL HS-MCNC: 25 NG/L
TSH SERPL DL<=0.005 MIU/L-ACNC: 1.54 UIU/ML (ref 0.3–4.2)
VENTRICULAR RATE- MUSE: 64 BPM
WBC # BLD AUTO: 6.4 10E3/UL (ref 4–11)

## 2024-02-21 PROCEDURE — 36415 COLL VENOUS BLD VENIPUNCTURE: CPT | Performed by: EMERGENCY MEDICINE

## 2024-02-21 PROCEDURE — 250N000011 HC RX IP 250 OP 636: Performed by: INTERNAL MEDICINE

## 2024-02-21 PROCEDURE — 80053 COMPREHEN METABOLIC PANEL: CPT | Performed by: INTERNAL MEDICINE

## 2024-02-21 PROCEDURE — G0378 HOSPITAL OBSERVATION PER HR: HCPCS

## 2024-02-21 PROCEDURE — 250N000013 HC RX MED GY IP 250 OP 250 PS 637: Performed by: INTERNAL MEDICINE

## 2024-02-21 PROCEDURE — 87040 BLOOD CULTURE FOR BACTERIA: CPT | Performed by: EMERGENCY MEDICINE

## 2024-02-21 PROCEDURE — C9113 INJ PANTOPRAZOLE SODIUM, VIA: HCPCS | Performed by: INTERNAL MEDICINE

## 2024-02-21 PROCEDURE — 258N000003 HC RX IP 258 OP 636: Performed by: INTERNAL MEDICINE

## 2024-02-21 PROCEDURE — 36415 COLL VENOUS BLD VENIPUNCTURE: CPT | Performed by: INTERNAL MEDICINE

## 2024-02-21 PROCEDURE — 93306 TTE W/DOPPLER COMPLETE: CPT

## 2024-02-21 PROCEDURE — 87637 SARSCOV2&INF A&B&RSV AMP PRB: CPT | Performed by: INTERNAL MEDICINE

## 2024-02-21 PROCEDURE — 999N000157 HC STATISTIC RCP TIME EA 10 MIN

## 2024-02-21 PROCEDURE — 250N000011 HC RX IP 250 OP 636: Mod: JZ | Performed by: EMERGENCY MEDICINE

## 2024-02-21 PROCEDURE — 92526 ORAL FUNCTION THERAPY: CPT | Mod: GN

## 2024-02-21 PROCEDURE — 96361 HYDRATE IV INFUSION ADD-ON: CPT

## 2024-02-21 PROCEDURE — 93306 TTE W/DOPPLER COMPLETE: CPT | Mod: 26 | Performed by: INTERNAL MEDICINE

## 2024-02-21 PROCEDURE — 84484 ASSAY OF TROPONIN QUANT: CPT | Performed by: INTERNAL MEDICINE

## 2024-02-21 PROCEDURE — 96372 THER/PROPH/DIAG INJ SC/IM: CPT | Performed by: INTERNAL MEDICINE

## 2024-02-21 PROCEDURE — 92610 EVALUATE SWALLOWING FUNCTION: CPT | Mod: GN

## 2024-02-21 PROCEDURE — 99233 SBSQ HOSP IP/OBS HIGH 50: CPT | Performed by: INTERNAL MEDICINE

## 2024-02-21 PROCEDURE — 94640 AIRWAY INHALATION TREATMENT: CPT

## 2024-02-21 PROCEDURE — 250N000009 HC RX 250: Performed by: INTERNAL MEDICINE

## 2024-02-21 PROCEDURE — 85025 COMPLETE CBC W/AUTO DIFF WBC: CPT | Performed by: INTERNAL MEDICINE

## 2024-02-21 PROCEDURE — 96375 TX/PRO/DX INJ NEW DRUG ADDON: CPT

## 2024-02-21 PROCEDURE — 71046 X-RAY EXAM CHEST 2 VIEWS: CPT

## 2024-02-21 PROCEDURE — 71260 CT THORAX DX C+: CPT | Mod: MG

## 2024-02-21 PROCEDURE — 84484 ASSAY OF TROPONIN QUANT: CPT | Performed by: EMERGENCY MEDICINE

## 2024-02-21 PROCEDURE — 96365 THER/PROPH/DIAG IV INF INIT: CPT

## 2024-02-21 PROCEDURE — 84145 PROCALCITONIN (PCT): CPT | Performed by: INTERNAL MEDICINE

## 2024-02-21 PROCEDURE — 84443 ASSAY THYROID STIM HORMONE: CPT | Performed by: INTERNAL MEDICINE

## 2024-02-21 RX ORDER — PANTOPRAZOLE SODIUM 40 MG/1
40 TABLET, DELAYED RELEASE ORAL 2 TIMES DAILY
Status: DISCONTINUED | OUTPATIENT
Start: 2024-02-21 | End: 2024-02-21

## 2024-02-21 RX ORDER — ONDANSETRON 2 MG/ML
4 INJECTION INTRAMUSCULAR; INTRAVENOUS EVERY 6 HOURS PRN
Status: DISCONTINUED | OUTPATIENT
Start: 2024-02-21 | End: 2024-02-27 | Stop reason: HOSPADM

## 2024-02-21 RX ORDER — AMOXICILLIN 250 MG
1 CAPSULE ORAL 2 TIMES DAILY PRN
Status: DISCONTINUED | OUTPATIENT
Start: 2024-02-21 | End: 2024-02-27 | Stop reason: HOSPADM

## 2024-02-21 RX ORDER — CEFEPIME HYDROCHLORIDE 2 G/1
2 INJECTION, POWDER, FOR SOLUTION INTRAVENOUS EVERY 24 HOURS
Status: DISCONTINUED | OUTPATIENT
Start: 2024-02-22 | End: 2024-02-25

## 2024-02-21 RX ORDER — ONDANSETRON 2 MG/ML
4 INJECTION INTRAMUSCULAR; INTRAVENOUS EVERY 6 HOURS PRN
Status: DISCONTINUED | OUTPATIENT
Start: 2024-02-21 | End: 2024-02-21

## 2024-02-21 RX ORDER — AMLODIPINE BESYLATE 5 MG/1
5 TABLET ORAL AT BEDTIME
Status: DISCONTINUED | OUTPATIENT
Start: 2024-02-21 | End: 2024-02-26

## 2024-02-21 RX ORDER — DOCUSATE SODIUM 100 MG/1
100 CAPSULE, LIQUID FILLED ORAL DAILY
Status: DISCONTINUED | OUTPATIENT
Start: 2024-02-21 | End: 2024-02-27 | Stop reason: HOSPADM

## 2024-02-21 RX ORDER — AMOXICILLIN 250 MG
2 CAPSULE ORAL 2 TIMES DAILY PRN
Status: DISCONTINUED | OUTPATIENT
Start: 2024-02-21 | End: 2024-02-25

## 2024-02-21 RX ORDER — HYDRALAZINE HYDROCHLORIDE 20 MG/ML
10 INJECTION INTRAMUSCULAR; INTRAVENOUS EVERY 6 HOURS PRN
Status: DISCONTINUED | OUTPATIENT
Start: 2024-02-21 | End: 2024-02-27 | Stop reason: HOSPADM

## 2024-02-21 RX ORDER — SODIUM CHLORIDE 9 MG/ML
INJECTION, SOLUTION INTRAVENOUS CONTINUOUS
Status: DISCONTINUED | OUTPATIENT
Start: 2024-02-21 | End: 2024-02-22

## 2024-02-21 RX ORDER — GABAPENTIN 100 MG/1
100 CAPSULE ORAL 2 TIMES DAILY
COMMUNITY

## 2024-02-21 RX ORDER — LOSARTAN POTASSIUM 50 MG/1
50 TABLET ORAL DAILY
Status: ON HOLD | COMMUNITY
End: 2024-02-27

## 2024-02-21 RX ORDER — GABAPENTIN 100 MG/1
100 CAPSULE ORAL 2 TIMES DAILY
Status: DISCONTINUED | OUTPATIENT
Start: 2024-02-21 | End: 2024-02-27 | Stop reason: HOSPADM

## 2024-02-21 RX ORDER — AMLODIPINE BESYLATE 5 MG/1
5 TABLET ORAL AT BEDTIME
Status: ON HOLD | COMMUNITY
End: 2024-02-27

## 2024-02-21 RX ORDER — CALCIUM CARBONATE/VITAMIN D3 600 MG-10
1 TABLET ORAL 2 TIMES DAILY
Status: ON HOLD | COMMUNITY
End: 2024-02-27

## 2024-02-21 RX ORDER — CEFEPIME HYDROCHLORIDE 2 G/1
2 INJECTION, POWDER, FOR SOLUTION INTRAVENOUS ONCE
Status: COMPLETED | OUTPATIENT
Start: 2024-02-21 | End: 2024-02-21

## 2024-02-21 RX ORDER — ALBUTEROL SULFATE 0.83 MG/ML
2.5 SOLUTION RESPIRATORY (INHALATION)
Status: DISCONTINUED | OUTPATIENT
Start: 2024-02-21 | End: 2024-02-22

## 2024-02-21 RX ORDER — PIPERACILLIN SODIUM, TAZOBACTAM SODIUM 3; .375 G/15ML; G/15ML
3.38 INJECTION, POWDER, LYOPHILIZED, FOR SOLUTION INTRAVENOUS ONCE
Status: DISCONTINUED | OUTPATIENT
Start: 2024-02-21 | End: 2024-02-21

## 2024-02-21 RX ORDER — LOSARTAN POTASSIUM 50 MG/1
50 TABLET ORAL DAILY
Status: DISCONTINUED | OUTPATIENT
Start: 2024-02-22 | End: 2024-02-26

## 2024-02-21 RX ORDER — IOPAMIDOL 755 MG/ML
75 INJECTION, SOLUTION INTRAVASCULAR ONCE
Status: COMPLETED | OUTPATIENT
Start: 2024-02-21 | End: 2024-02-21

## 2024-02-21 RX ORDER — HEPARIN SODIUM 5000 [USP'U]/.5ML
5000 INJECTION, SOLUTION INTRAVENOUS; SUBCUTANEOUS EVERY 8 HOURS
Status: DISCONTINUED | OUTPATIENT
Start: 2024-02-21 | End: 2024-02-25

## 2024-02-21 RX ORDER — ASPIRIN 81 MG
100 TABLET, DELAYED RELEASE (ENTERIC COATED) ORAL DAILY
COMMUNITY

## 2024-02-21 RX ORDER — ONDANSETRON 4 MG/1
4 TABLET, ORALLY DISINTEGRATING ORAL EVERY 6 HOURS PRN
Status: DISCONTINUED | OUTPATIENT
Start: 2024-02-21 | End: 2024-02-21

## 2024-02-21 RX ADMIN — ALBUTEROL SULFATE 2.5 MG: 2.5 SOLUTION RESPIRATORY (INHALATION) at 19:34

## 2024-02-21 RX ADMIN — PANTOPRAZOLE SODIUM 40 MG: 40 INJECTION, POWDER, FOR SOLUTION INTRAVENOUS at 20:46

## 2024-02-21 RX ADMIN — HEPARIN SODIUM 5000 UNITS: 5000 INJECTION, SOLUTION INTRAVENOUS; SUBCUTANEOUS at 20:47

## 2024-02-21 RX ADMIN — DOCUSATE SODIUM 100 MG: 100 CAPSULE, LIQUID FILLED ORAL at 13:22

## 2024-02-21 RX ADMIN — CEFEPIME 2 G: 2 INJECTION, POWDER, FOR SOLUTION INTRAVENOUS at 02:48

## 2024-02-21 RX ADMIN — IOPAMIDOL 75 ML: 755 INJECTION, SOLUTION INTRAVENOUS at 03:16

## 2024-02-21 RX ADMIN — SODIUM CHLORIDE: 9 INJECTION, SOLUTION INTRAVENOUS at 01:25

## 2024-02-21 RX ADMIN — ALBUTEROL SULFATE 2.5 MG: 2.5 SOLUTION RESPIRATORY (INHALATION) at 13:07

## 2024-02-21 RX ADMIN — HEPARIN SODIUM 5000 UNITS: 5000 INJECTION, SOLUTION INTRAVENOUS; SUBCUTANEOUS at 13:22

## 2024-02-21 RX ADMIN — SODIUM CHLORIDE: 9 INJECTION, SOLUTION INTRAVENOUS at 13:23

## 2024-02-21 ASSESSMENT — ACTIVITIES OF DAILY LIVING (ADL)
ADLS_ACUITY_SCORE: 35
ADLS_ACUITY_SCORE: 38
ADLS_ACUITY_SCORE: 35
ADLS_ACUITY_SCORE: 38
ADLS_ACUITY_SCORE: 40
ADLS_ACUITY_SCORE: 35
ADLS_ACUITY_SCORE: 38
ADLS_ACUITY_SCORE: 40
ADLS_ACUITY_SCORE: 38
ADLS_ACUITY_SCORE: 35
ADLS_ACUITY_SCORE: 35

## 2024-02-21 NOTE — H&P
[facility] MEDICINE ADMISSION HISTORY AND PHYSICAL       Assessment & Plan      Present on Admission (POA)    1. Problems with swallowing -- leading to vomit, aspiration risks. With generalized weakness and  poor oral intake.     - Frequent clearing of her mouth/throat.      - She has history of Advanced achalasia with GERD - and secondary esophagitis/esophageal diverticulum. S/P multiple botulinum toxin injections for management of lower esophageal junction and improvement of dysphagia ----  Last Botox Jan 2024     - IVF, NPO, GI eval  - aspiration precs  - elev head/speech eval --- not sure if PEG tube maybe better, for long term nutrition care.   - Chest XR, CBC/CMP     2. Trop elevation, non specific STT changes - denies CP   - tele and trop  - ECHO in AM -- if (+) WMA --- refer to cardio    - BNP check       Chronic Medical Conditions    1. HTN  2. CKD stage III         VTE prophylaxis --  SCDs, if appropriate, add SQH  Diet --  Cardiac diet  Code Status -- Full,  Barriers to discharge -- Admitting/Acute medical condition/s  Discharge Disposition and goals --  Unable to determine at this point, pending progress/treatment response.     PPE - I was wearing PPE including but not limited to - N95 mask, Gloves, and/or Safety glasses.  Admission Status --     Care plan is based on available information and patient's condition at encounter. Care plan was discussed and agreed to proceed by the patient/family member. Made aware that care plan may change.     I recommend to review/revise history/care plan for information/results not available during my encounter. All or some home medication/s were not resumed or was modified on admission due to safety concerns. Will have rounding AM doc  review safety to resume held/modified home medications.     Availability -- If need to coordinate care after night shift  -- Contact assigned AM rounding Hospitalist.     75 minutes spent by me on the date of  service doing chart review, history, exam, diagnostic test results interpretation, care coordination with RN, RT and/or Pharm, & further activities per the note.      Cheko Burleson MD, MPH, FACP, [facility]  Internal Medicine - Hospitalist        Chief Complaint Weakness      HISTORY     - Patient was seen in ED - A. Met her daughter too.     - She is here with weakness and poor oral intake. Food doesn't go down and unable to keep things down.     - She was clearing frequently her mouth when I met her. She showed me a whitish phlegm. She has no CP but feels SOB. No abdominal breathing.    - No fevers. No abdominal pain. No diarrhea    - She has history of advanced achalasia with gastroesophageal reflux disease and secondary esophagitis/esophageal diverticulum.  Status post multiple botulinum toxin injections for management of lower esophageal junction and improvement of dysphagia.    - ED course/treatments/referral - Mild trop elev. EKG SR/Non specific STT changes     - ROS --- No headache. No dizziness. No weakness. No CP or SOB. No palpitations. No abdominal pain. No nausea or vomiting. No urinary symptoms. No bleeding symptoms. No weight loss. Rest of 12 point ROS was reviewed and negative.       Past Medical History     Past Medical History:   Diagnosis Date    Gastroesophageal reflux disease     Hypertension          Surgical History     Past Surgical History:   Procedure Laterality Date    ABDOMINAL HYSTERECTOMY      CHOLECYSTECTOMY      ESOPHAGOGASTRODUODENOSCOPY, WITH BOTULINUM TOXIN INJECTION N/A 1/4/2023    Procedure: ESOPHAGOGASTRODUODENOSCOPY WITH BOTOX;  Surgeon: Nolna Howe MD;  Location: [facility]    ESOPHAGOSCOPY, GASTROSCOPY, DUODENOSCOPY (EGD), COMBINED N/A 4/3/2015    Procedure: UPPER ENDOSCOPY WITH injection of BOTOX;  Surgeon: Loi Alberto MD;  Location: [facility] GI;  Service:         Family History      No family history on file.      Social History      .  Social  History     Socioeconomic History    Marital status: Single     Spouse name: Not on file    Number of children: Not on file    Years of education: Not on file    Highest education level: Not on file   Occupational History    Not on file   Tobacco Use    Smoking status: Never    Smokeless tobacco: Not on file   Substance and Sexual Activity    Alcohol use: No    Drug use: No    Sexual activity: Not on file   Other Topics Concern    Not on file   Social History Narrative    Not on file     Social Determinants of Health     Financial Resource Strain: Not on file   Food Insecurity: Not on file   Transportation Needs: Not on file   Physical Activity: Not on file   Stress: Not on file   Social Connections: Not on file   Interpersonal Safety: Not on file   Housing Stability: Not on file          Allergies        Allergies   Allergen Reactions    Penicillins Hives    Seafood          Prior to Admission Medications      No current facility-administered medications on file prior to encounter.  atenolol (TENORMIN) 25 MG tablet, [ATENOLOL (TENORMIN) 25 MG TABLET] Take 12.5 mg by mouth 2 (two) times a day.  calcium citrate-vitamin D (CITRACAL+D) 315-200 mg-unit per tablet, [CALCIUM CITRATE-VITAMIN D (CITRACAL+D) 315-200 MG-UNIT PER TABLET] Take 1 tablet by mouth 2 (two) times a day.  denosumab 60 mg/mL Syrg, [DENOSUMAB 60 MG/ML SYRG] Inject 60 mg under the skin every 6 (six) months.  gabapentin (NEURONTIN) 100 MG capsule, Take 1 capsule (100 mg) by mouth 3 times daily  losartan-hydrochlorothiazide (HYZAAR) 50-12.5 mg per tablet, [LOSARTAN-HYDROCHLOROTHIAZIDE (HYZAAR) 50-12.5 MG PER TABLET] Take 1 tablet by mouth daily.  pantoprazole (PROTONIX) 40 MG tablet, [PANTOPRAZOLE (PROTONIX) 40 MG TABLET] Take 40 mg by mouth 2 (two) times a day.            Review of Systems     A 12 point comprehensive review of systems was negative except as noted above in HPI.    PHYSICAL EXAMINATION       Vitals      Vitals: BP (!) 149/67   Pulse 68    Temp 98.1  F (36.7  C) (Temporal)   Resp 16   SpO2 96%   BMI= There is no height or weight on file to calculate BMI.      Examination     General Appearance:  Alert, cooperative, no distress  Head:    Normocephalic, without obvious abnormality, atraumatic  EENT:  PERRL, conjunctiva/corneas clear, EOM's intact.   Neck:   Supple, symmetrical, trachea midline, no adenopathy; no NVE  Back:  Symmetric, no curvature, no CVA tenderness  Chest/Lungs: Clear to auscultation bilaterally, respirations unlabored, No tenderness or deformity. No abdominal breathing or use of accessory muscles.   Heart:    Regular rate and rhythm, S1 and S2 normal, no murmur, rub   or gallop  Abdomen: Soft, non-tender, bowel sounds active all four quadrants, not peritoneal on palpation. Not distended  Extremities:  Extremities normal, atraumatic, no swelling   Skin:  Skin color, texture, turgor normal, no rashes or lesion  Neurologic:  Awake and alert, No laetralizing or localizing signs       Pertinent Lab     Results for orders placed or performed during the hospital encounter of 02/20/24   Basic metabolic panel   Result Value Ref Range    Sodium 141 135 - 145 mmol/L    Potassium 4.0 3.4 - 5.3 mmol/L    Chloride 104 98 - 107 mmol/L    Carbon Dioxide (CO2) 25 22 - 29 mmol/L    Anion Gap 12 7 - 15 mmol/L    Urea Nitrogen 31.2 (H) 8.0 - 23.0 mg/dL    Creatinine 0.99 (H) 0.51 - 0.95 mg/dL    GFR Estimate 52 (L) >60 mL/min/1.73m2    Calcium 10.9 (H) 8.2 - 9.6 mg/dL    Glucose 104 (H) 70 - 99 mg/dL   Hepatic function panel   Result Value Ref Range    Protein Total 8.5 (H) 6.4 - 8.3 g/dL    Albumin 3.9 3.5 - 5.2 g/dL    Bilirubin Total 0.4 <=1.2 mg/dL    Alkaline Phosphatase 69 40 - 150 U/L    AST 22 0 - 45 U/L    ALT <5 0 - 50 U/L    Bilirubin Direct <0.20 0.00 - 0.30 mg/dL   CBC with platelets and differential   Result Value Ref Range    WBC Count 6.9 4.0 - 11.0 10e3/uL    RBC Count 4.03 3.80 - 5.20 10e6/uL    Hemoglobin 11.6 (L) 11.7 - 15.7 g/dL     Hematocrit 37.1 35.0 - 47.0 %    MCV 92 78 - 100 fL    MCH 28.8 26.5 - 33.0 pg    MCHC 31.3 (L) 31.5 - 36.5 g/dL    RDW 13.4 10.0 - 15.0 %    Platelet Count 264 150 - 450 10e3/uL    % Neutrophils 73 %    % Lymphocytes 15 %    % Monocytes 7 %    % Eosinophils 4 %    % Basophils 1 %    % Immature Granulocytes 0 %    NRBCs per 100 WBC 0 <1 /100    Absolute Neutrophils 5.0 1.6 - 8.3 10e3/uL    Absolute Lymphocytes 1.1 0.8 - 5.3 10e3/uL    Absolute Monocytes 0.5 0.0 - 1.3 10e3/uL    Absolute Eosinophils 0.3 0.0 - 0.7 10e3/uL    Absolute Basophils 0.1 0.0 - 0.2 10e3/uL    Absolute Immature Granulocytes 0.0 <=0.4 10e3/uL    Absolute NRBCs 0.0 10e3/uL   Result Value Ref Range    Troponin T, High Sensitivity 26 (H) <=14 ng/L   Result Value Ref Range    Magnesium 2.0 1.7 - 2.3 mg/dL           Pertinent Radiology

## 2024-02-21 NOTE — PHARMACY-ADMISSION MEDICATION HISTORY
Pharmacist Admission Medication History    Admission medication history is complete. The information provided in this note is only as accurate as the sources available at the time of the update.    Medication reconciliation/reorder completed by provider prior to medication history? No    Information Source(s): Patient and CareEverywhere/SureScripts via in-person, phone call with granddaughter, Ailyn Dunn, who takes care of patient's medications.    Pertinent Information:     Changes made to PTA medication list:  Added: All the medications listed were added to the PTA Med List during this encounter except for denosumab   Deleted: atenolol, calcium, losarta-hydrochlorothiazide, pantoprazole  Changed: gabapentin dose was decreased to bid    Medication Affordability:       Allergies reviewed with patient and updates made in EHR: yes    Medications available for use during hospital stay: NONE.     Medication History Completed By: Hayden Fuentes [facility] 2/21/2024 8:00 AM    PTA Med List   Medication Sig Last Dose    amLODIPine (NORVASC) 5 MG tablet Take 5 mg by mouth at bedtime 2/19/2024 at hs    calcium carbonate-vitamin D (CALTRATE) 600-10 MG-MCG per tablet Take 1 tablet by mouth 2 times daily 2/20/2024 at am    docusate sodium (COLACE) 100 MG tablet Take 100 mg by mouth daily 2/20/2024 at am    gabapentin (NEURONTIN) 100 MG capsule Take 100 mg by mouth 2 times daily 2/20/2024 at am    losartan (COZAAR) 50 MG tablet Take 50 mg by mouth daily 2/19/2024 at am    omeprazole (PRILOSEC) 20 MG DR capsule Take 20 mg by mouth 2 times daily 2/20/2024 at am

## 2024-02-21 NOTE — CONSULTS
[facility] DIGESTIVE HEALTH CONSULTATION    Kim Dunn   [address]   [facility] [address]  89 year old female  Admission Date/Time: 2/20/2024 11:20 PM    Primary Care Provider:  Braulio Chirinos    Requesting Physician: Chito Li MD      CHIEF COMPLAINT:   trouble swallowing    REASON FOR THE CONSULT:  Dysphagia    HPI:   Kim Dunn is a 89-year-old female with history of achalasia, Zenker's diverticulum, GERD, esophagitis, recurrent aspiration, hypertension, CKD stage III who is admitted for trouble swallowing.    Patient presented on 220/24 for worsening achalasia symptoms including trouble swallowing, choking and coughing with food intake.  Denies any fevers, chills, abdominal pain, GI bleeding.  She reports having poor oral intake and feels food getting stuck in her neck and chest areas.  Her last EGD was in January 2024 done by Dr. Howe and reviewed a Zenker's diverticulum, mid esophageal diverticulum and signs of achalasia status post 100 units of Botox injection.  Previously, she has had multiple upper endoscopies with Botox injections for treatment.    On arrival to the ED, was afebrile and hemodynamically stable.  CT chest, abdomen and pelvis with contrast showed possible aspiration pneumonia, intraluminal fluid present in the esophagus.    REVIEW OF SYSTEMS: 13 point review of systems is negative except that noted above.    PAST MEDICAL HISTORY:   Past Medical History:   Diagnosis Date    Gastroesophageal reflux disease     Hypertension        PAST SURGICAL HISTORY:   Past Surgical History:   Procedure Laterality Date    ABDOMINAL HYSTERECTOMY      CHOLECYSTECTOMY      ESOPHAGOGASTRODUODENOSCOPY, WITH BOTULINUM TOXIN INJECTION N/A 1/4/2023    Procedure: ESOPHAGOGASTRODUODENOSCOPY WITH BOTOX;  Surgeon: Nolan Howe MD;  Location: [facility] OR    ESOPHAGOSCOPY, GASTROSCOPY, DUODENOSCOPY (EGD), COMBINED N/A 4/3/2015    Procedure: UPPER ENDOSCOPY WITH injection of BOTOX;   Surgeon: Loi Alberto MD;  Location: [facility];  Service:        FAMILY HISTORY: No family history on file.    SOCIAL HISTORY:   Social History     Tobacco Use    Smoking status: Never    Smokeless tobacco: Not on file   Substance Use Topics    Alcohol use: No        MEDS:  (Not in a hospital admission)      ALLERGIES/SENSITIVITIES: Penicillins, Seafood, and Tetracycline    MEDICATIONS:  Current Outpatient Medications   Medication Sig Dispense Refill    amLODIPine (NORVASC) 5 MG tablet Take 5 mg by mouth at bedtime      calcium carbonate-vitamin D (CALTRATE) 600-10 MG-MCG per tablet Take 1 tablet by mouth 2 times daily      docusate sodium (COLACE) 100 MG tablet Take 100 mg by mouth daily      gabapentin (NEURONTIN) 100 MG capsule Take 100 mg by mouth 2 times daily      losartan (COZAAR) 50 MG tablet Take 50 mg by mouth daily      omeprazole (PRILOSEC) 20 MG DR capsule Take 20 mg by mouth 2 times daily      denosumab 60 mg/mL Syrg [DENOSUMAB 60 MG/ML SYRG] Inject 60 mg under the skin every 6 (six) months.         PHYSICAL EXAM:  Temp:  [97.8  F (36.6  C)-98.1  F (36.7  C)] 97.9  F (36.6  C)  Pulse:  [51-75] 66  Resp:  [12-35] 35  BP: (130-169)/(60-75) 141/63  SpO2:  [91 %-100 %] 100 %  Body mass index is 21.97 kg/m .  GEN: Well developed, well nourished 89 year old in no acute distress.  HEENT: sclera anicteric, moist mucous membranes.   LYMPH: No cervical lymphadenopathy  PULM: Nonlabored breathing. Breath sounds equal.   CARDIO: Regular rate  GI: Non-distended. Soft.  Non-tender to palpation.  No guarding. No rebound tenderness.  EXT: warm, no lower extremity edema  NEURO: Alert. No focal defects.   PSYCH: Mental status appropriate, mood and affect normal.    SKIN: No rashes  MSK: No joint abnormalities    LABORATORY DATA:  CBC RESULTS:   Recent Labs   Lab Test 02/21/24  0546   WBC 6.4   RBC 3.53*   HGB 10.4*   HCT 32.6*   MCV 92   MCH 29.5   MCHC 31.9   RDW 13.5           CMP Results:   Recent  "Labs   Lab Test 02/21/24  0546      POTASSIUM 3.7   CHLORIDE 105   CO2 24   ANIONGAP 10   *   BUN 23.6*   CR 0.79   BILITOTAL 0.3   ALKPHOS 57   ALT <5   AST 20        INR Results: No results for input(s): \"INR\" in the last 87923 hours.       RELEVANT IMAGING:    EXAM: CT CHEST/ABDOMEN/PELVIS W CONTRAST  LOCATION: [facility]  DATE: 2/21/2024     INDICATION: aphagia, abnormal x ray  COMPARISON: None.  TECHNIQUE: CT scan of the chest, abdomen, and pelvis was performed following injection of IV contrast. Multiplanar reformats were obtained. Dose reduction techniques were used.   CONTRAST: [id number] 75ml     FINDINGS:   LUNGS AND PLEURA: Diffuse lung findings of peripheral reticulation, groundglass opacity, and scattered areas of bronchiectasis, findings suggestive of nonspecific interstitial pneumonia. Acute superimposed infectious pneumonia such as due to aspiration   would be difficult to exclude, this study may serve as a baseline for future followup. No pleural effusion or pneumothorax.     MEDIASTINUM/AXILLAE: Diffuse atherosclerotic calcifications of the aortic arch and descending thoracic aorta. No aneurysmal dilatation. Cardiomegaly with marked biatrial enlargement. Mitral annular calcifications. Prominent mediastinal lymph nodes, which   may be reactive. Intraluminal fluid present within the thoracic esophagus, which raises the risk for aspiration. Right thyroid nodule measuring 2.4 cm (series 4 image 35).     CORONARY ARTERY CALCIFICATION: Moderate.     HEPATOBILIARY: Hepatic cysts which require no dedicated follow-up. Unremarkable gallbladder.     PANCREAS: Somewhat atrophic with mild prominence of the main pancreatic duct, otherwise unremarkable.     SPLEEN: Normal.     ADRENAL GLANDS: Normal.     KIDNEYS/BLADDER: Tiny renal cysts which require no dedicated follow-up. No hydronephrosis. Unremarkable urinary bladder.     BOWEL: Colonic diverticulosis. Moderate colonic " stool. No bowel obstruction.     LYMPH NODES: Normal.     VASCULATURE: Diffuse atherosclerotic calcifications of the aorta and its branches without evidence of aneurysmal dilatation.     PELVIC ORGANS: Hysterectomy.     MUSCULOSKELETAL: Old healed fractures of the right superior and inferior pubic rami. Old healed sacral ala fractures. Multiple old healed rib fractures. Multilevel degenerative changes of the cervicothoracic and lumbosacral spine. Exaggerated thoracic   kyphosis. No definite acute osseous abnormality.                                                                      IMPRESSION:     1.  Diffuse lung findings of peripheral reticulation, groundglass opacity, and scattered areas of bronchiectasis, findings suggestive of nonspecific interstitial pneumonia. Acute superimposed infectious pneumonia such as due to aspiration would be   difficult to exclude, this study may serve as a baseline for future followup. Consider referral to Pulmonology for management.  2.  Intraluminal fluid present within the thoracic esophagus, which raises the risk for aspiration.   3.  Cardiomegaly with marked biatrial enlargement. Mitral annular calcifications. Moderate atherosclerotic calcifications of the coronary arteries. Diffuse atherosclerotic calcifications of the thoracic and abdominal aorta and their branches without   aneurysm or dissection.  4.  2.4 cm right thyroid nodule for which thyroid ultrasound is recommended for further characterization, which may be performed on a nonemergent outpatient basis.  5.  Colonic diverticulosis without diverticulitis. Moderate colonic stool. No other acute process within the abdomen or pelvis.        ASSESSMENT:   Kim Dunn is a 89-year-old female with history of achalasia, Zenker's diverticulum, GERD, esophagitis, recurrent aspiration, hypertension, CKD stage III who is admitted for trouble swallowing.  It seems that she had improvement every time after Botox injections, but  most recently, she has been having worsening symptoms more frequently.  Given her age and frailty, would probably recommend continued Botox injections rather than surgical fixation.      PLAN:  Keep n.p.o.  IV fluids as needed  Plan for upper endoscopy with Botox injection on 2/22/2024        60 minutes of total time was spent providing patient care, including patient evaluation, reviewing documentation/test results and .                                                Chito Li M.D.  Thank you for the opportunity to participate in the care of this patient.   Please feel free to call me with any questions or concerns.  Phone number [phone].              CC: [facility], Braulio Chirinos

## 2024-02-21 NOTE — PROGRESS NOTES
Pt last seen on RA sating 100%. Scheduled Albuterol neb treatment given. BS clear;diminished and crackles. RT will continue to follow.

## 2024-02-21 NOTE — PROGRESS NOTES
[facility] Medicine PROGRESS NOTE      Identification/Summary: Kim Dunn is a 89 year old female with a past medical history of advanced achalasia, GERD, esophagitis, esophageal diverticulum, recurrent aspiration, hypertension, CKD 3 who was admitted on for coughing and choking with swallowing, some shortness of breath 2/20/2024 for recurrent aspiration pneumonia, for speech and GI evaluation.  CT chest abdomen pelvis with contrast shows findings consistent with recurrent aspiration pneumonia, thyroid nodule.    She has abnormal EKG with ST-T wave changes, no chest pain, troponin mildly elevated in 20s.  Echocardiogram with EF of 70%, moderate to severe MR pulmonary hypertension,  noted.     Assessment and Plan:  recurrent aspiration pneumonia  Advanced achalasia history of Botox injections  Esophageal diverticulum  GERD   acute on chronic dysphagia  Currently on room air  Afebrile.  No leukocytosis  Continue cefepime, change to cefdinir at discharge  N.p.o.  Aspiration precautions  Awaiting GI, speech eval    Weight loss  Malnutrition  Dietitian to see    Thyroid nodule  Follow-up as outpatient if family wishes to.    Hypertension  Continue home amlodipine, losartan    History of CKD 3  Creatinine improved from baseline creatinine of 1.4.    Abnormal EKG, mild troponin elevation  Nonspecific ST-T wave changes  No chest pain.   Echo this admission EF 70%    Moderate to severe MR  Likely not a surgical candidate    Pulmonary hypertension    Diet: NPO for Medical/Clinical Reasons Except for: Ice Chips, No Exceptions  DVT Prophylaxis:   Start heparin  Code Status: Full Code    Clinically Significant Risk Factors Present on Admission           # Hypercalcemia: Highest Ca = 10.9 mg/dL in last 2 days, will monitor as appropriate    # Hypoalbuminemia: Lowest albumin = 3.3 g/dL at 2/21/2024  5:46 AM, will monitor as appropriate     # Hypertension: Noted on problem list                    Anticipated possible  "discharge in 1-2 days once clinically improvement, speech, GI eval  milestones are met.    Interval History/Subjective:  She is constantly clearing her throat, coughing, bringing up whitish phlegm. .  Grandson at bedside.  Her breathing is stable.  No chest pain.  She has had increasing cough in the last week.  No leg swelling.  She has been coughing and choking whenever she tries to eat and feels like food gets stuck.  Last seen GI last year. Gets Botox injections grandson also reports recent weight loss.     Physical Exam/Objective:  Vitals I/O   Vital signs:  Temp: 97.9  F (36.6  C) Temp src: Oral BP: (!) 141/63 Pulse: 65   Resp: 16 SpO2: 96 % O2 Device: None (Room air)   Height: 142.2 cm (4' 8\") Weight: 44.5 kg (98 lb)  Estimated body mass index is 21.97 kg/m  as calculated from the following:    Height as of this encounter: 1.422 m (4' 8\").    Weight as of this encounter: 44.5 kg (98 lb).      I/O last 3 completed shifts:  In: 1100 [IV Piggyback:1100]  Out: -      Body mass index is 21.97 kg/m .    General Appearance:  Alert, cooperative, persistent coughing no distress   Head:  Normocephalic, atraumatic   Eyes:  PERRL    Throat:  mucosa; moist   Neck: No JVD, thyromegaly   Lungs:   Crackles, rhonchi bilaterally, respirations unlabored   Chest Wall:  No tenderness or deformity   Heart:  Regular rate and rhythm, S1, S2 normal,no murmur   Abdomen:   Soft, non tender, non distended, bowel sounds present, no guarding or rigidity   Extremities: No edema, no joint swelling   Skin: Skin color, texture, turgor normal, no rashes or lesions   Neurologic: Alert and oriented X 3, Moves all 4 extremities       Medications:   Personally Reviewed.   [START ON 2/22/2024] ceFEPIme  2 g Intravenous Q24H       Data reviewed today: I personally reviewed all new medications, labs, imaging/diagnostics reports over the past 24 hours. Pertinent findings include    Labs:  Most Recent 3 CBC's:  Recent Labs   Lab Test 02/21/24  0546 " 02/20/24 2226   WBC 6.4 6.9   HGB 10.4* 11.6*   MCV 92 92    264     Most Recent 3 BMP's:  Recent Labs   Lab Test 02/21/24  0546 02/20/24 2226 05/05/23  1047    141 135*   POTASSIUM 3.7 4.0 4.3   CHLORIDE 105 104 97*   CO2 24 25 25   BUN 23.6* 31.2* 31.7*   CR 0.79 0.99* 1.41*   ANIONGAP 10 12 13   NEHA 9.3 10.9* 10.6*   * 104* 108*     Most Recent 2 LFT's:  Recent Labs   Lab Test 02/21/24 0546 02/20/24 2226   AST 20 22   ALT <5 <5   ALKPHOS 57 69   BILITOTAL 0.3 0.4       Imaging:   Recent Results (from the past 24 hour(s))   XR Chest 2 Views    Narrative    EXAM: XR CHEST 2 VIEWS  LOCATION: [facility]  DATE: 2/21/2024    INDICATION: difficulty swallowing  COMPARISON: 02/20/2019      Impression    IMPRESSION: Diffuse patchy infiltrates of both lungs left greater than right, which could be seen with multifocal aspiration pneumonia in the right clinical context. Upper limits normal heart size without discrete evidence of pulmonary vascular   congestion. Atherosclerotic calcifications of the aortic arch and descending thoracic aorta. No significant pleural effusion. No pneumothorax. Osteopenia. Multilevel thoracic wedge compression deformities appearing grossly chronic, with exaggerated   thoracic kyphosis. Multiple old healed rib fractures on the right. No definite acute osseous abnormality.   CT Chest/Abdomen/Pelvis w Contrast    Narrative    EXAM: CT CHEST/ABDOMEN/PELVIS W CONTRAST  LOCATION: [facility]  DATE: 2/21/2024    INDICATION: aphagia, abnormal x ray  COMPARISON: None.  TECHNIQUE: CT scan of the chest, abdomen, and pelvis was performed following injection of IV contrast. Multiplanar reformats were obtained. Dose reduction techniques were used.   CONTRAST: [id number] 75ml    FINDINGS:   LUNGS AND PLEURA: Diffuse lung findings of peripheral reticulation, groundglass opacity, and scattered areas of bronchiectasis, findings suggestive of  nonspecific interstitial pneumonia. Acute superimposed infectious pneumonia such as due to aspiration   would be difficult to exclude, this study may serve as a baseline for future followup. No pleural effusion or pneumothorax.    MEDIASTINUM/AXILLAE: Diffuse atherosclerotic calcifications of the aortic arch and descending thoracic aorta. No aneurysmal dilatation. Cardiomegaly with marked biatrial enlargement. Mitral annular calcifications. Prominent mediastinal lymph nodes, which   may be reactive. Intraluminal fluid present within the thoracic esophagus, which raises the risk for aspiration. Right thyroid nodule measuring 2.4 cm (series 4 image 35).    CORONARY ARTERY CALCIFICATION: Moderate.    HEPATOBILIARY: Hepatic cysts which require no dedicated follow-up. Unremarkable gallbladder.    PANCREAS: Somewhat atrophic with mild prominence of the main pancreatic duct, otherwise unremarkable.    SPLEEN: Normal.    ADRENAL GLANDS: Normal.    KIDNEYS/BLADDER: Tiny renal cysts which require no dedicated follow-up. No hydronephrosis. Unremarkable urinary bladder.    BOWEL: Colonic diverticulosis. Moderate colonic stool. No bowel obstruction.    LYMPH NODES: Normal.    VASCULATURE: Diffuse atherosclerotic calcifications of the aorta and its branches without evidence of aneurysmal dilatation.    PELVIC ORGANS: Hysterectomy.    MUSCULOSKELETAL: Old healed fractures of the right superior and inferior pubic rami. Old healed sacral ala fractures. Multiple old healed rib fractures. Multilevel degenerative changes of the cervicothoracic and lumbosacral spine. Exaggerated thoracic   kyphosis. No definite acute osseous abnormality.      Impression    IMPRESSION:    1.  Diffuse lung findings of peripheral reticulation, groundglass opacity, and scattered areas of bronchiectasis, findings suggestive of nonspecific interstitial pneumonia. Acute superimposed infectious pneumonia such as due to aspiration would be   difficult to  exclude, this study may serve as a baseline for future followup. Consider referral to Pulmonology for management.  2.  Intraluminal fluid present within the thoracic esophagus, which raises the risk for aspiration.   3.  Cardiomegaly with marked biatrial enlargement. Mitral annular calcifications. Moderate atherosclerotic calcifications of the coronary arteries. Diffuse atherosclerotic calcifications of the thoracic and abdominal aorta and their branches without   aneurysm or dissection.  4.  2.4 cm right thyroid nodule for which thyroid ultrasound is recommended for further characterization, which may be performed on a nonemergent outpatient basis.  5.  Colonic diverticulosis without diverticulitis. Moderate colonic stool. No other acute process within the abdomen or pelvis.     Echocardiogram Complete    Narrative    [id number]  [id number]  [id number]  [id number]^[place] [address]     Name: CHERRY JIMENEZ  MRN: [id number]  : 1928  Study Date: 2024 08:36 AM  Age: 89 yrs  Gender: Female  Patient Location: [facility]  Reason For Study: SOB  Ordering Physician: ROSALIND IBRAHIM  Performed By: JASON     BSA: 1.3 m2  Height: 56 in  Weight: 98 lb  HR: 63  BP: 130/60 mmHg  ______________________________________________________________________________  Procedure  Complete Portable Echo Adult.  ______________________________________________________________________________  Interpretation Summary     1. Normal left ventricular size and systolic performance with a visually  estimated ejection fraction of 70%.  2. There is mild aortic insufficiency.  3. There is moderate to severe mitral insufficiency.  4. There is moderate tricuspid insufficiency.  5. Normal right ventricular size and systolic performance.  6. There is severe left atrial enlargement. There is mild to moderate right  atrial enlargement.  7. Right ventricular systolic pressure relative to right  atrial pressure is  moderately increased. The pulmonary artery pressure is estimated to be 60-65  mmHg plus right atrial pressure (IVC is mildly dilated).  ______________________________________________________________________________  Left ventricle:  Normal left ventricular size and systolic performance with a visually  estimated ejection fraction of 70%. There is normal regional wall motion. Left  ventricular wall thickness is normal.     Assessment of LV Diastolic Function: The cumulative findings suggest impaired  diastolic filling [The septal e' velocity is < 7 cm/s & lateral e' velocity  is  < 10 cm/s. The average E/e' is >14. TR velocity is > 2.8 m/s. Left atrial  volume index is greater than 34 mL/mÂ ].     Assessment of left atrial pressure (LAP): The cumulative findings suggest  moderately elevated left atrial pressure (the E/A is > 0.8 and <2.0 plus 2 or  3 of 3 of the following present: Average E/e' > 14, TRvel > 2.8 m/s, and/or LA  vol. index > 34 ml/mÂ  ).     Right ventricle:  Normal right ventricular size and systolic performance.     Left atrium:  There is severe left atrial enlargement.     Right atrium:  There is mild to moderate right atrial enlargement.     IVC:  The IVC is mildly dilated.     Aortic valve:  The aortic valve is comprised of three cusps. There is no significant aortic  stenosis. There is mild aortic insufficiency.     Mitral valve:  There is mild mitral annular calcification. There is mild nonspecific mitral  valve leaflet thickening. There is moderate to severe mitral insufficiency.     Tricuspid valve:  The tricuspid valve is grossly morphologically normal. There is moderate  tricuspid insufficiency.     Pulmonic valve:  The pulmonic valve is grossly morphologically normal.     Thoracic aorta:  The aortic root and proximal ascending aorta are of normal dimension.     Pericardium:  There is no significant pericardial  effusion.  ______________________________________________________________________________  ______________________________________________________________________________  MMode/2D Measurements & Calculations  IVSd: 1.1 cm  LVIDd: 3.7 cm  LVIDs: 2.5 cm  LVPWd: 0.78 cm  FS: 32.2 %  LV mass(C)d: 101.5 grams  LV mass(C)dI: 77.4 grams/m2  Ao root diam: 2.9 cm  asc Aorta Diam: 2.7 cm  LVOT diam: 1.9 cm  LVOT area: 2.8 cm2  Ao root diam index Ht(cm/m): 2.0  Ao root diam index BSA (cm/m2): 2.2  Asc Ao diam index BSA (cm/m2): 2.1  Asc Ao diam index Ht(cm/m): 1.9  LA Volume (BP): 89.2 ml     LA Volume Index (BP): 68.1 ml/m2  LA Volume Indexed (AL/bp): 6.4 ml/m2  RV Base: 5.0 cm  RWT: 0.42  TAPSE: 2.2 cm     Time Measurements  MM HR: 63.0 BPM     Doppler Measurements & Calculations  MV E max madi: 119.0 cm/sec  MV A max madi: 85.7 cm/sec  MV E/A: 1.4  MV max P.3 mmHg  MV mean P.6 mmHg  MV V2 VTI: 42.1 cm  MVA(VTI): 1.3 cm2  MV dec slope: 571.3 cm/sec2  MV dec time: 0.21 sec  Ao V2 max: 148.7 cm/sec  Ao max P.0 mmHg  Ao V2 mean: 101.2 cm/sec  Ao mean P.9 mmHg  Ao V2 VTI: 33.5 cm  ILIANA(I,D): 1.6 cm2  ILIANA(V,D): 1.9 cm2  AI P1/2t: 490.1 msec  LV V1 max PG: 3.8 mmHg  LV V1 max: 96.8 cm/sec  LV V1 VTI: 18.5 cm  MR PISA: 4.0 cm2  MR ERO: 0.23 cm2  MR volume: 46.2 ml  SV(LVOT): 52.6 ml  SI(LVOT): 40.1 ml/m2  PA acc time: 0.11 sec  TR max madi: 402.7 cm/sec  TR max P.9 mmHg  AV Madi Ratio (DI): 0.65     ILIANA Index (cm2/m2): 1.2  E/E': 15.0  E/E' av.4  Lateral E/e': 15.0  Medial E/e': 21.9  Peak E' Madi: 7.9 cm/sec  RV S Madi: 13.2 cm/sec     ______________________________________________________________________________  Report approved by: Grazyna Barillas 2024 11:15 AM             EKG: New T wave inversions in lateral leads, lead III         55 MINUTES SPENT BY ME on the date of service doing chart review, history, exam, documentation & further activities per the note.    Sara Bush  MD  Hospitalist  [facility]

## 2024-02-21 NOTE — ED TRIAGE NOTES
"Pt presents with daughter after pt has not been able to eat or drink anything. Pt states \"I'll eat and it does not want to go down so I have to vomit\". Unable to keep down water or Gatorade. Previous dx of Zenker's diverticulum. Family has noticed increased weakness over the last week as well.         "

## 2024-02-21 NOTE — ED PROVIDER NOTES
"EMERGENCY DEPARTMENT ENCOUNTER      NAME: Kim Dunn  YOB: 1928  MRN: [id number]    FINAL IMPRESSION  1. Achalasia    2. Generalized weakness    3. Aspiration pneumonitis (H)        MEDICAL DECISION MAKING   Pertinent Labs & Imaging studies reviewed. (See chart for details)    Kim Dunn is a 89 year old female who presents for evaluation of difficulty swallowing.  Outside records reviewed.  Patient has a history of hypertension, GERD, polymyalgia rheumatica, and achalasia.  She has been seen by GI in the past and underwent Botox injection, most recently in January 2023.  Today, she presents with her daughter for evaluation of difficulty swallowing that has been ongoing for about 1 week.  She reports that she is not able to keep any food or fluids down, as she feels she needs to spit up after attempting to swallow.  Daughter notes that she tried to give patient some Gatorade earlier today and she spit that up as well so she is concerned that patient is getting dehydrated.  Patient complains of feeling generally weak but has no difficulty breathing, chest or abdominal pain, fever, or other new complaints.  She reports that her current symptoms feel similar to what she experienced with \"esophagus problems\" in the past.  Vitals on arrival notable for elevated blood pressure but otherwise stable and reassuring.    I considered a broad differential including but not limited to esophageal foreign body, esophageal dysphagia, stricture, mass/tumor, web, achalasia, motility disorder, atypical ACS, unstable angina.  Patient is not having any difficulty breathing or stridor to suggest tracheal foreign body.  I did also consider aspiration/pneumonia or other cardiopulmonary process but feel this less likely given history and exam.  Her inability to tolerate food or fluids does raise concern for acute kidney injury, metabolic derangement.  Discussed options for work-up and management with the patient and " "daughter.  We have agreed on plan for labs, chest x-ray, EKG, and likely admission for GI consultation given her increasing weakness and inability to tolerate any food or fluids.    EKG without ischemic changes.  CBC reassuring. No evidence of leukocytosis to suggest systemic infectious/inflammatory process. No acute anemia. PLTs wnl. BMP reassuring. No evidence of JAVI, acidosis, or significant electrolyte derangement. LFTs reassuring. No acute elevation of bilirubin or transaminates to suggest acute hepatobiliary process.  Troponin slightly elevated at 24 but EKG without ischemic changes.  Viral swabs negative.  I independently reviewed chest x-ray which revealed patchy infiltrates bilaterally concerning for developing aspiration pneumonia.  Given her repeat \"spitting up and inability to swallow, will plan to start antibiotics and proceed with CT scan to further characterize.    I discussed the case with Dr. Burleson of hospital medicine team who agreed to facilitate admission.  I updated patient and her daughter with results and recommendations for admission, which they were both hoping for.  Results of CT and repeat troponin are pending.    I independently reviewed CXR (as noted above), formal radiologist read pending.      Medical Decision Making  Obtained supplemental history:Supplemental history obtained?: Documented in chart  Reviewed external records: External records reviewed?: Documented in chart  Care impacted by chronic illness:Chronic Kidney Disease, Hypertension, and Other: zenker's diverticulum  Care significantly affected by social determinants of health:Access to Medical Care  Did you consider but not order tests?: Work up considered but not performed and documented in chart, if applicable  Did you interpret images independently?: Independent interpretation of ECG and images noted in documentation, when applicable.  Consultation discussion with other provider:Did you involve another provider " (consultant, MH, pharmacy, etc.)?: I discussed the care with another health care provider, see documentation for details.  Admit.      ED COURSE  10:59 PM I met with the patient, obtained history, performed an initial exam, and discussed options and plan for diagnostics and treatment here in the ED.   11:58 PM I Spoke with Dr. Burleson, Hospitalist. We further discussed the patient's case and reviewed ED work-up so far. He agrees to admit the patient.  1:28 AM I spoke with the accepting hospitalist, Dr. Burleson.        MEDICATIONS GIVEN IN THE ED  Medications - No data to display    NEW PRESCRIPTIONS STARTED AT TODAY'S VISIT  New Prescriptions    No medications on file          =================================================================    Chief Complaint   Patient presents with    Nausea & Vomiting    Generalized Weakness         HPI:    Patient information was obtained from: The patient    Use of : N/A     Kim Dunn is a 89 year old female who presents with nausea, vomiting, and generalized weakness.    The patient has felt generally weak since 02/15 (5 days ago). Her daughter reports that she has been unable to keep any liquids or solids down for the past 5 days as well. She was diagnosed with Zenker's diverticulum. She lives with her sister and her grandchildren checks up on her occasionally. She does not use a walker to get around. She denies any recent falls with her generalized weakness. No complaints of fevers, shortness of breath and any other medical complaints or concerns at this time.      RELEVANT HISTORY, MEDICATIONS, & ALLERGIES   Past medical history, surgical history, family history, medications, and allergies reviewed and pertinent noted in HPI.    REVIEW OF SYSTEMS:  A complete review of systems was performed with pertinent positives and negatives noted in the HPI. All other systems negative.     PHYSICAL EXAM:    Vitals: BP (!) 149/67   Pulse 68   Temp 98.1  F (36.7  C)  (Temporal)   Resp 16   SpO2 96%    General: Alert and interactive, comfortable appearing. Occasionally spitting into emesis bag.  HENT: Atraumatic. Full AROM of neck. Conjunctiva clear.   Cardiovascular: Regular rate and rhythm.   Chest/Pulmonary: Normal work of breathing. Speaking in complete sentences. Lungs CTAB. No chest wall tenderness or deformities.  Abdomen: Soft, nondistended. Nontender without guarding or rebound.  Extremities: Normal AROM of all major joints.  Skin: Warm and dry. Normal skin color.   Neuro: Speech clear. CNs grossly intact. Moves all extremities spontaneously.   Psych: Normal affect/mood, cooperative, memory appropriate.    LAB  Labs Ordered and Resulted from Time of ED Arrival to Time of ED Departure   BASIC METABOLIC PANEL - Abnormal       Result Value    Sodium 141      Potassium 4.0      Chloride 104      Carbon Dioxide (CO2) 25      Anion Gap 12      Urea Nitrogen 31.2 (*)     Creatinine 0.99 (*)     GFR Estimate 52 (*)     Calcium 10.9 (*)     Glucose 104 (*)    HEPATIC FUNCTION PANEL - Abnormal    Protein Total 8.5 (*)     Albumin 3.9      Bilirubin Total 0.4      Alkaline Phosphatase 69      AST 22      ALT <5      Bilirubin Direct <0.20     CBC WITH PLATELETS AND DIFFERENTIAL - Abnormal    WBC Count 6.9      RBC Count 4.03      Hemoglobin 11.6 (*)     Hematocrit 37.1      MCV 92      MCH 28.8      MCHC 31.3 (*)     RDW 13.4      Platelet Count 264      % Neutrophils 73      % Lymphocytes 15      % Monocytes 7      % Eosinophils 4      % Basophils 1      % Immature Granulocytes 0      NRBCs per 100 WBC 0      Absolute Neutrophils 5.0      Absolute Lymphocytes 1.1      Absolute Monocytes 0.5      Absolute Eosinophils 0.3      Absolute Basophils 0.1      Absolute Immature Granulocytes 0.0      Absolute NRBCs 0.0         RADIOLOGY  XR Chest 2 Views   Final Result   IMPRESSION: Diffuse patchy infiltrates of both lungs left greater than right, which could be seen with multifocal  aspiration pneumonia in the right clinical context. Upper limits normal heart size without discrete evidence of pulmonary vascular    congestion. Atherosclerotic calcifications of the aortic arch and descending thoracic aorta. No significant pleural effusion. No pneumothorax. Osteopenia. Multilevel thoracic wedge compression deformities appearing grossly chronic, with exaggerated    thoracic kyphosis. Multiple old healed rib fractures on the right. No definite acute osseous abnormality.      Echocardiogram Complete    (Results Pending)       EKG  Performed at: February 20, 2024, 11:41 PM  Impression: Sinus rhythm.  Right axis deviation.  Incomplete RBBB.  RVH with repolarization abnormality.  No clear ischemic changes.  Compared to previous, T waves now inverted in lead II, V2, V3.  Rate: 64 BPM  Rhythm: Sinus rhythm  QRS Interval: 116 ms  QTc Interval: 410/422 ms  Comparison: 7-FEB-2014    All laboratory and imaging results and EKG's were personally reviewed and interpreted by myself prior to disposition decision.           I, Og Mckeon, am serving as a scribe to document services personally performed by Dr. Mildred Burciaga based on my observation and the provider's statements to me. I, Mildred Burciaga MD attest that Og Mckeon is acting in a scribe capacity, has observed my performance of the services and has documented them in accordance with my direction.    Mildred Burciaga M.D.  Emergency Medicine  [facility] EMERGENCY ROOM  [address] [id number] [phone]  Dept: [phone]     Mildred Burciaga MD  02/21/24 0450

## 2024-02-22 ENCOUNTER — ANESTHESIA EVENT (OUTPATIENT)
Dept: SURGERY | Facility: CLINIC | Age: 89
DRG: 177 | End: 2024-02-22
Payer: MEDICARE

## 2024-02-22 ENCOUNTER — ANESTHESIA (OUTPATIENT)
Dept: SURGERY | Facility: CLINIC | Age: 89
DRG: 177 | End: 2024-02-22
Payer: MEDICARE

## 2024-02-22 PROBLEM — J69.0 ASPIRATION PNEUMONITIS (H): Status: ACTIVE | Noted: 2024-02-22

## 2024-02-22 LAB
ANION GAP SERPL CALCULATED.3IONS-SCNC: 13 MMOL/L (ref 7–15)
BUN SERPL-MCNC: 18 MG/DL (ref 8–23)
CALCIUM SERPL-MCNC: 9.1 MG/DL (ref 8.2–9.6)
CHLORIDE SERPL-SCNC: 105 MMOL/L (ref 98–107)
CREAT SERPL-MCNC: 0.76 MG/DL (ref 0.51–0.95)
DEPRECATED HCO3 PLAS-SCNC: 21 MMOL/L (ref 22–29)
EGFRCR SERPLBLD CKD-EPI 2021: 71 ML/MIN/1.73M2
ERYTHROCYTE [DISTWIDTH] IN BLOOD BY AUTOMATED COUNT: 13.3 % (ref 10–15)
GLUCOSE SERPL-MCNC: 98 MG/DL (ref 70–99)
HCT VFR BLD AUTO: 31.2 % (ref 35–47)
HGB BLD-MCNC: 9.9 G/DL (ref 11.7–15.7)
MCH RBC QN AUTO: 29.1 PG (ref 26.5–33)
MCHC RBC AUTO-ENTMCNC: 31.7 G/DL (ref 31.5–36.5)
MCV RBC AUTO: 92 FL (ref 78–100)
PLATELET # BLD AUTO: 231 10E3/UL (ref 150–450)
POTASSIUM SERPL-SCNC: 3.4 MMOL/L (ref 3.4–5.3)
RBC # BLD AUTO: 3.4 10E6/UL (ref 3.8–5.2)
SODIUM SERPL-SCNC: 139 MMOL/L (ref 135–145)
UPPER GI ENDOSCOPY: NORMAL
WBC # BLD AUTO: 7.3 10E3/UL (ref 4–11)

## 2024-02-22 PROCEDURE — 258N000003 HC RX IP 258 OP 636: Performed by: STUDENT IN AN ORGANIZED HEALTH CARE EDUCATION/TRAINING PROGRAM

## 2024-02-22 PROCEDURE — 360N000075 HC SURGERY LEVEL 2, PER MIN: Performed by: INTERNAL MEDICINE

## 2024-02-22 PROCEDURE — 94640 AIRWAY INHALATION TREATMENT: CPT

## 2024-02-22 PROCEDURE — 0DB68ZX EXCISION OF STOMACH, VIA NATURAL OR ARTIFICIAL OPENING ENDOSCOPIC, DIAGNOSTIC: ICD-10-PCS | Performed by: INTERNAL MEDICINE

## 2024-02-22 PROCEDURE — 96376 TX/PRO/DX INJ SAME DRUG ADON: CPT

## 2024-02-22 PROCEDURE — G0378 HOSPITAL OBSERVATION PER HR: HCPCS

## 2024-02-22 PROCEDURE — 88305 TISSUE EXAM BY PATHOLOGIST: CPT | Mod: TC | Performed by: INTERNAL MEDICINE

## 2024-02-22 PROCEDURE — 36415 COLL VENOUS BLD VENIPUNCTURE: CPT | Performed by: INTERNAL MEDICINE

## 2024-02-22 PROCEDURE — 250N000013 HC RX MED GY IP 250 OP 250 PS 637: Performed by: INTERNAL MEDICINE

## 2024-02-22 PROCEDURE — 710N000012 HC RECOVERY PHASE 2, PER MINUTE: Performed by: INTERNAL MEDICINE

## 2024-02-22 PROCEDURE — 272N000001 HC OR GENERAL SUPPLY STERILE: Performed by: INTERNAL MEDICINE

## 2024-02-22 PROCEDURE — 999N000141 HC STATISTIC PRE-PROCEDURE NURSING ASSESSMENT: Performed by: INTERNAL MEDICINE

## 2024-02-22 PROCEDURE — 258N000003 HC RX IP 258 OP 636: Performed by: INTERNAL MEDICINE

## 2024-02-22 PROCEDURE — 120N000001 HC R&B MED SURG/OB

## 2024-02-22 PROCEDURE — 80048 BASIC METABOLIC PNL TOTAL CA: CPT | Performed by: INTERNAL MEDICINE

## 2024-02-22 PROCEDURE — 250N000011 HC RX IP 250 OP 636: Mod: JZ | Performed by: INTERNAL MEDICINE

## 2024-02-22 PROCEDURE — 999N000157 HC STATISTIC RCP TIME EA 10 MIN

## 2024-02-22 PROCEDURE — 3E0G8GC INTRODUCTION OF OTHER THERAPEUTIC SUBSTANCE INTO UPPER GI, VIA NATURAL OR ARTIFICIAL OPENING ENDOSCOPIC: ICD-10-PCS | Performed by: INTERNAL MEDICINE

## 2024-02-22 PROCEDURE — 250N000011 HC RX IP 250 OP 636: Performed by: INTERNAL MEDICINE

## 2024-02-22 PROCEDURE — 370N000017 HC ANESTHESIA TECHNICAL FEE, PER MIN: Performed by: INTERNAL MEDICINE

## 2024-02-22 PROCEDURE — 250N000009 HC RX 250: Performed by: INTERNAL MEDICINE

## 2024-02-22 PROCEDURE — 96372 THER/PROPH/DIAG INJ SC/IM: CPT | Performed by: INTERNAL MEDICINE

## 2024-02-22 PROCEDURE — 99233 SBSQ HOSP IP/OBS HIGH 50: CPT | Performed by: HOSPITALIST

## 2024-02-22 PROCEDURE — 250N000020 HC RX IP 250 OP 636 J0585: Mod: JZ | Performed by: INTERNAL MEDICINE

## 2024-02-22 PROCEDURE — 85027 COMPLETE CBC AUTOMATED: CPT | Performed by: INTERNAL MEDICINE

## 2024-02-22 PROCEDURE — 250N000011 HC RX IP 250 OP 636: Performed by: NURSE ANESTHETIST, CERTIFIED REGISTERED

## 2024-02-22 PROCEDURE — 258N000003 HC RX IP 258 OP 636: Performed by: HOSPITALIST

## 2024-02-22 PROCEDURE — C9113 INJ PANTOPRAZOLE SODIUM, VIA: HCPCS | Performed by: INTERNAL MEDICINE

## 2024-02-22 RX ORDER — FLUMAZENIL 0.1 MG/ML
0.2 INJECTION, SOLUTION INTRAVENOUS
Status: ACTIVE | OUTPATIENT
Start: 2024-02-22 | End: 2024-02-22

## 2024-02-22 RX ORDER — NALOXONE HYDROCHLORIDE 0.4 MG/ML
0.1 INJECTION, SOLUTION INTRAMUSCULAR; INTRAVENOUS; SUBCUTANEOUS
Status: DISCONTINUED | OUTPATIENT
Start: 2024-02-22 | End: 2024-02-24

## 2024-02-22 RX ORDER — ALBUTEROL SULFATE 0.83 MG/ML
2.5 SOLUTION RESPIRATORY (INHALATION) EVERY 4 HOURS PRN
Status: DISCONTINUED | OUTPATIENT
Start: 2024-02-22 | End: 2024-02-27 | Stop reason: HOSPADM

## 2024-02-22 RX ORDER — PROPOFOL 10 MG/ML
INJECTION, EMULSION INTRAVENOUS PRN
Status: DISCONTINUED | OUTPATIENT
Start: 2024-02-22 | End: 2024-02-22

## 2024-02-22 RX ORDER — LIDOCAINE 40 MG/G
CREAM TOPICAL
Status: DISCONTINUED | OUTPATIENT
Start: 2024-02-22 | End: 2024-02-22 | Stop reason: HOSPADM

## 2024-02-22 RX ORDER — ONDANSETRON 4 MG/1
4 TABLET, ORALLY DISINTEGRATING ORAL EVERY 30 MIN PRN
Status: DISCONTINUED | OUTPATIENT
Start: 2024-02-22 | End: 2024-02-22 | Stop reason: HOSPADM

## 2024-02-22 RX ORDER — SODIUM CHLORIDE, SODIUM LACTATE, POTASSIUM CHLORIDE, CALCIUM CHLORIDE 600; 310; 30; 20 MG/100ML; MG/100ML; MG/100ML; MG/100ML
INJECTION, SOLUTION INTRAVENOUS CONTINUOUS
Status: DISCONTINUED | OUTPATIENT
Start: 2024-02-22 | End: 2024-02-22 | Stop reason: HOSPADM

## 2024-02-22 RX ORDER — SODIUM CHLORIDE, SODIUM LACTATE, POTASSIUM CHLORIDE, CALCIUM CHLORIDE 600; 310; 30; 20 MG/100ML; MG/100ML; MG/100ML; MG/100ML
INJECTION, SOLUTION INTRAVENOUS CONTINUOUS
Status: DISCONTINUED | OUTPATIENT
Start: 2024-02-22 | End: 2024-02-24

## 2024-02-22 RX ORDER — ONDANSETRON 2 MG/ML
4 INJECTION INTRAMUSCULAR; INTRAVENOUS EVERY 30 MIN PRN
Status: DISCONTINUED | OUTPATIENT
Start: 2024-02-22 | End: 2024-02-22 | Stop reason: HOSPADM

## 2024-02-22 RX ORDER — ONDANSETRON 2 MG/ML
INJECTION INTRAMUSCULAR; INTRAVENOUS PRN
Status: DISCONTINUED | OUTPATIENT
Start: 2024-02-22 | End: 2024-02-22

## 2024-02-22 RX ADMIN — ONDANSETRON 4 MG: 2 INJECTION INTRAMUSCULAR; INTRAVENOUS at 10:35

## 2024-02-22 RX ADMIN — SODIUM CHLORIDE: 9 INJECTION, SOLUTION INTRAVENOUS at 02:57

## 2024-02-22 RX ADMIN — SODIUM CHLORIDE, POTASSIUM CHLORIDE, SODIUM LACTATE AND CALCIUM CHLORIDE: 600; 310; 30; 20 INJECTION, SOLUTION INTRAVENOUS at 10:24

## 2024-02-22 RX ADMIN — ALBUTEROL SULFATE 2.5 MG: 2.5 SOLUTION RESPIRATORY (INHALATION) at 02:06

## 2024-02-22 RX ADMIN — GABAPENTIN 100 MG: 100 CAPSULE ORAL at 21:05

## 2024-02-22 RX ADMIN — SODIUM CHLORIDE, POTASSIUM CHLORIDE, SODIUM LACTATE AND CALCIUM CHLORIDE: 600; 310; 30; 20 INJECTION, SOLUTION INTRAVENOUS at 15:06

## 2024-02-22 RX ADMIN — HEPARIN SODIUM 5000 UNITS: 5000 INJECTION, SOLUTION INTRAVENOUS; SUBCUTANEOUS at 05:14

## 2024-02-22 RX ADMIN — DICLOFENAC 2 G: 10 GEL TOPICAL at 21:21

## 2024-02-22 RX ADMIN — HEPARIN SODIUM 5000 UNITS: 5000 INJECTION, SOLUTION INTRAVENOUS; SUBCUTANEOUS at 21:12

## 2024-02-22 RX ADMIN — PROPOFOL 20 MG: 10 INJECTION, EMULSION INTRAVENOUS at 10:35

## 2024-02-22 RX ADMIN — DICLOFENAC 2 G: 10 GEL TOPICAL at 02:02

## 2024-02-22 RX ADMIN — PROPOFOL 20 MG: 10 INJECTION, EMULSION INTRAVENOUS at 10:37

## 2024-02-22 RX ADMIN — AMLODIPINE BESYLATE 5 MG: 5 TABLET ORAL at 21:06

## 2024-02-22 RX ADMIN — PROPOFOL 20 MG: 10 INJECTION, EMULSION INTRAVENOUS at 10:33

## 2024-02-22 RX ADMIN — PANTOPRAZOLE SODIUM 40 MG: 40 INJECTION, POWDER, FOR SOLUTION INTRAVENOUS at 09:30

## 2024-02-22 RX ADMIN — SODIUM CHLORIDE: 9 INJECTION, SOLUTION INTRAVENOUS at 12:37

## 2024-02-22 RX ADMIN — PANTOPRAZOLE SODIUM 40 MG: 40 INJECTION, POWDER, FOR SOLUTION INTRAVENOUS at 21:15

## 2024-02-22 RX ADMIN — HEPARIN SODIUM 5000 UNITS: 5000 INJECTION, SOLUTION INTRAVENOUS; SUBCUTANEOUS at 13:59

## 2024-02-22 RX ADMIN — CEFEPIME 2 G: 2 INJECTION, POWDER, FOR SOLUTION INTRAVENOUS at 03:39

## 2024-02-22 ASSESSMENT — ACTIVITIES OF DAILY LIVING (ADL)
ADLS_ACUITY_SCORE: 29
ADLS_ACUITY_SCORE: 40
ADLS_ACUITY_SCORE: 29
ADLS_ACUITY_SCORE: 40
ADLS_ACUITY_SCORE: 29
ADLS_ACUITY_SCORE: 40
ADLS_ACUITY_SCORE: 40
ADLS_ACUITY_SCORE: 29
ADLS_ACUITY_SCORE: 40

## 2024-02-22 NOTE — PROVIDER NOTIFICATION
02/22/24 0800   RCAT Assessment   Reason for Assessment Other (see comments)  (aspiration pnumonia)   Pulmonary Status 0   Surgical Status 0   Chest X-ray 0   Respiratory Pattern 0   Mental Status 0   Breath Sounds 0   Cough Effectiveness 0   Level of Activity 0   O2 Required for SpO2>=92% 0   Acuity Level (points) 0   Acuity Level  5     Patient is seen on room air, breath sounds are clear. No shortness of breath reported at this time.Due to RCAT protocol points and evaluation will make nebs prn. Pt made aware she can call at anytime and is ok with this plan    Katarzyna Layton, RT

## 2024-02-22 NOTE — PROGRESS NOTES
"BP (!) 151/61   Pulse 68   Temp 97.4  F (36.3  C) (Oral)   Resp 20   Ht 1.422 m (4' 8\")   Wt 44.5 kg (98 lb)   SpO2 100%   BMI 21.97 kg/m        The PT was provided nebs per MD order. BS were clear both pre and post with each TX. RT will follow as directed.  "

## 2024-02-22 NOTE — PRE-PROCEDURE
Pre-procedure Note    Reason for procedure: dysphagia, achalasia    History and Physical Reviewed: Reviewed, no changes.    Pre-sedation assessment:    General: alert, appears stated age, and cooperative  Airway: normal  Heart: regular rate and rhythm  Lungs: clear to auscultation bilaterally    Sedation Plan based on assessment: Moderate    Mallampati score: Class II (visualization of the soft palate, fauces, and uvula)          ASA Classification: ASA 3 - Patient with moderate systemic disease with functional limitations    Impression: Patient deemed adequate candidate for sedation    Risks, benefits and alternatives were discussed with the patient and informed consent was obtained.    Plan: esophagogastroduodenoscopy with botox injection                                                    Chito Li M.D.  Thank you for the opportunity to participate in the care of this patient.   Please feel free to call me with any questions or concerns.  Phone number [phone].

## 2024-02-22 NOTE — PLAN OF CARE
Problem: Swallowing Impairment  Goal: Optimal Eating and Swallowing Without Aspiration  Outcome: Progressing     Problem: Fall Injury Risk  Goal: Absence of Fall and Fall-Related Injury  Outcome: Progressing  Intervention: Identify and Manage Contributors  Recent Flowsheet Documentation  Taken 2/22/2024 0333 by Katarzyna Jain RN  Medication Review/Management: medications reviewed  Taken 2/22/2024 0003 by Katarzyna Jain RN  Medication Review/Management: medications reviewed  Taken 2/21/2024 2045 by Katarzyna Jain RN  Medication Review/Management: medications reviewed  Intervention: Promote Injury-Free Environment  Recent Flowsheet Documentation  Taken 2/22/2024 0333 by Katarzyna Jain RN  Safety Promotion/Fall Prevention:   activity supervised   assistive device/personal items within reach   clutter free environment maintained   increased rounding and observation   increase visualization of patient   lighting adjusted   mobility aid in reach   nonskid shoes/slippers when out of bed   room near nurse's station   room organization consistent   safety round/check completed  Taken 2/22/2024 0100 by Katarzyna Jain RN  Safety Promotion/Fall Prevention: safety round/check completed  Taken 2/22/2024 0003 by Katarzyna Jain RN  Safety Promotion/Fall Prevention:   activity supervised   assistive device/personal items within reach   clutter free environment maintained   increased rounding and observation   increase visualization of patient   lighting adjusted   mobility aid in reach   nonskid shoes/slippers when out of bed   room near nurse's station   room organization consistent   safety round/check completed  Taken 2/21/2024 2045 by Katarzyna Jain RN  Safety Promotion/Fall Prevention:   activity supervised   assistive device/personal items within reach   clutter free environment maintained   increased rounding and observation   increase visualization of patient   lighting adjusted   mobility aid in reach   nonskid  "shoes/slippers when out of bed   room near nurse's station   room organization consistent   safety round/check completed     Problem: Delirium  Goal: Improved Behavioral Control  Outcome: Progressing  Intervention: Minimize Safety Risk  Recent Flowsheet Documentation  Taken 2/22/2024 0333 by Katarzyna Jain RN  Enhanced Safety Measures: room near unit station  Taken 2/22/2024 0003 by Katarzyna Jain RN  Enhanced Safety Measures: room near unit station  Taken 2/21/2024 2045 by Katarzyna Jain RN  Enhanced Safety Measures: room near unit station    PRIMARY DIAGNOSIS: \"GENERIC\" NURSING  OUTPATIENT/OBSERVATION GOALS TO BE MET BEFORE DISCHARGE:  ADLs back to baseline: No    Activity and level of assistance: Up with standby assistance.    Pain status: Pain free.    Return to near baseline physical activity: No     Discharge Planner Nurse   Safe discharge environment identified: Yes  Barriers to discharge: Yes       Entered by: Katarzyna Jain RN 02/22/2024 4:33 AM     Please review provider order for any additional goals.   Nurse to notify provider when observation goals have been met and patient is ready for discharge.    Goal Outcome Evaluation:  Patient A&O x3, disoriented to situation. Very hard of hearing, has hearing aids at bedside. VSS on RA, elevated BP. On continuous pulse ox. Denied pain throughout shift. NSR and sinus dysrhythmia on telemetry. Right PIV infusing at 75mL/hr. Intermittent IV antibiotics. Lung sounds diminished. Has CAMPOS. Needs a sputum sample. Denies chest pain.Voids spontaneously. No BM during shift. NPO, except ice chips. Stand by assist with gait belt. Discharge pending.     "

## 2024-02-22 NOTE — PROGRESS NOTES
"Speech-Language Pathology: Clinical Swallow Evaluation      02/21/24 1100   Appointment Info   Signing Clinician's Name / Credentials (SLP) Gracia Mckeon MA, CCC-SLP   General Information   Onset of Illness/Injury or Date of Surgery 02/20/24   Referring Physician Geeta Burleson MD   Pertinent History of Current Problem   Per H&P, \"Kim Dunn is a 89 year old female with a past medical history of advanced achalasia, GERD, esophagitis, esophageal diverticulum, recurrent aspiration, hypertension, CKD 3 who was admitted on for coughing and choking with swallowing, some shortness of breath 2/20/2024 for recurrent aspiration pneumonia, for speech and GI evaluation. CT chest abdomen pelvis with contrast shows findings consistent with recurrent aspiration pneumonia, thyroid nodule.\" Speech Therapy was consulted to evaluate swallow function due to concerns for aspiration.     General Observations Patient alert and cooperative. Her grandson Jimmy was present. Patient is extremely [place] even with her hearing aids in.   Type of Evaluation   Type of Evaluation Swallow Evaluation   Oral Motor   Oral Musculature generally intact   Dentition (Oral Motor)   Dentition (Oral Motor) adequate dentition   Facial Symmetry (Oral Motor)   Facial Symmetry (Oral Motor) WNL   Lip Function (Oral Motor)   Lip Range of Motion (Oral Motor) WNL   Tongue Function (Oral Motor)   Tongue ROM (Oral Motor) WNL   Jaw Function (Oral Motor)   Jaw Function (Oral Motor) WNL   Vocal Quality/Secretion Management (Oral Motor)   Vocal Quality (Oral Motor) WNL   Secretion Management (Oral Motor) WNL   Comment, Vocal Quality/Secretion Management (Oral Motor) Patient's vocal quality was clear. She completed frequent throat-clearing during session, bringing up small amounts of clear phlegm.   General Swallowing Observations   Past History of Dysphagia   Per review of EMR, patient participated in a video swallow study with on 10/21/14. Speech Therapy report is " "not available, but Radiologist's report states the following: \"Swallow study with Speech Pathology using multiple barium thicknesses.  Normal swallowing of pudding consistency. There is minimal vallecular pooling but no aspiration during swallowing of honey, nectar, thin liquids, and barium coated cracker.  In the seated upright position, standing column of barium was again seen in the esophagus. Esophagus is irregular in contour with large diverticula are noted which appear similar to prior examination.\"     Respiratory Support room air   Current Diet/Method of Nutritional Intake (General Swallowing Observations, NIS) NPO   Swallowing Evaluation Clinical swallow evaluation   Clinical Swallow Evaluation   Feeding Assistance minimal assistance required   Clinical Swallow Evaluation Textures Trialed thin liquids   Clinical Swallow Eval: Thin Liquid Texture Trial   Mode of Presentation, Thin Liquids spoon;fed by clinician   Volume of Liquid or Food Presented 5 ice chips; two 1/2 tsp water   Oral Phase of Swallow WFL   Pharyngeal Phase of Swallow feeling of something stuck in throat;repeated swallows;throat clearing;coughing/choking   Diagnostic Statement Subjectively, swallow response appeared timely. Patient presented with multiple swallows per single bolus followed by throat clearing and a delayed cough.   Esophageal Phase of Swallow   Patient reports or presents with symptoms of esophageal dysphagia Yes   Esophageal comments Patient has an extensive history of esophageal dysphagia and has followed with [facility] for management of symptoms.   Swallowing Recommendations   Diet Consistency Recommendations NPO;ice chips only   Supervision Level for Intake 1:1 supervision needed   Mode of Delivery Recommendations bolus size, small  (One chip at a time)   Medication Administration Recommendations, Swallowing (SLP) Recommend non-oral meds as able   General Therapy Interventions   Planned Therapy Interventions Dysphagia " Treatment   Dysphagia treatment Modified diet education;Compensatory strategies for swallowing;Instruction of safe swallow strategies   Clinical Impression   Criteria for Skilled Therapeutic Interventions Met (SLP Eval) Yes, treatment indicated   SLP Diagnosis Esophageal dysphagia   Risks & Benefits of therapy have been explained evaluation/treatment results reviewed;care plan/treatment goals reviewed;current/potential barriers reviewed;participants voiced agreement with care plan;participants included;patient;caregiver  (Grandelías Marquez was present)   Clinical Impression Comments   Clinical swallow evaluation completed per MD order. Patient was coughing up small amounts of clear phlegm upon arrival. She is eager to resume oral intake, but reports that she has difficulty swallowing even her saliva. She required multiple swallows to clear small sips of water and single ice chips. She then presented with throat clearing and intermittent delayed coughing. No other textures/consistencies were trialed due to safety concerns.     Given patient's extensive history of esophageal dysphagia, recommend consideration of GI consult. Until then, continue NPO except for ice chips for comfort as well as to maintain integrity of oral mucosa. Speech Therapy will follow peripherally and complete further diagnostic treatment of swallow as indicated.     SLP Total Evaluation Time   Eval: oral/pharyngeal swallow function, clinical swallow Minutes (23996) 9   SLP Goals   Therapy Frequency (SLP Eval) 5 times/week   SLP Predicted Duration/Target Date for Goal Attainment 02/28/24   SLP Goals Swallow   SLP: Safely tolerate diet without signs/symptoms of aspiration Soft & bite sized diet;Thin liquids;With use of swallow precautions;With use of compensatory swallow strategies;With assistance/supervision   Interventions   Interventions Quick Adds Swallowing Dysfunction   Swallowing Dysfunction &/or Oral Function for Feeding   Treatment of  Swallowing Dysfunction &/or Oral Function for Feeding Minutes (21892) 8   Symptoms Noted During/After Treatment Fatigue   Treatment Detail/Skilled Intervention Educated patient and grandson on the three phases of swallow (oral, pharyngeal, esophageal) and explained difference between prandial aspiration and aspiration from esophageal reflux/regurgitation. Patient had difficulty hearing SLP so grandson had to repeat the information to her. Grandson asked appropriate questions regarding plan of care for swallow/PO intake. SLP answered these to his verbalized understanding and satisfaction.   SLP Discharge Planning   SLP Plan F/U re: GI's plan of care   SLP Discharge Recommendation home   SLP Rationale for DC Rec Suspect oropharyngeal swallow function is at or near baseline.   SLP Brief overview of current status  Recommend continue NPO except ice chips at this time. Anticipate further diagnostic treatment of swallow following GI procedure(s).       [facility]  OUTPATIENT SPEECH LANGUAGE PATHOLOGY EVALUATION  PLAN OF TREATMENT FOR OUTPATIENT REHABILITATION  (COMPLETE FOR INITIAL CLAIMS ONLY)  Patient's Last Name, First Name, M.I.  YOB: 1928  Kim Dunn                        Provider's Name  [facility] Medical Record No.  [id number]                             Onset Date:  02/20/24  Start of Care Date:  2/21/2024   Type:     ___PT   ___OT   _X_SLP Medical Diagnosis:  Aspiration into airway          SLP Diagnosis:  Esophageal dysphagia  Visits from SOC:  1     See note for plan of treatment, functional goals and certification details    I CERTIFY THE NEED FOR THESE SERVICES FURNISHED UNDER        THIS PLAN OF TREATMENT AND WHILE UNDER MY CARE     (Physician co-signature of this document indicates review and certification of the therapy plan).

## 2024-02-22 NOTE — PROGRESS NOTES
"PRIMARY DIAGNOSIS: \"GENERIC\" NURSING  OUTPATIENT/OBSERVATION GOALS TO BE MET BEFORE DISCHARGE:  ADLs back to baseline: No    Activity and level of assistance: Up with standby assistance.    Pain status: Pain free.    Return to near baseline physical activity: No     Discharge Planner Nurse   Safe discharge environment identified: Yes  Barriers to discharge: Yes       Entered by: Katarzyna Jain RN 02/21/2024 11:19 PM     Please review provider order for any additional goals.   Nurse to notify provider when observation goals have been met and patient is ready for discharge.  "

## 2024-02-22 NOTE — ANESTHESIA POSTPROCEDURE EVALUATION
Patient: Kim Dunn    Procedure: Procedure(s):  ESOPHAGOGASTRODUODENOSCOPY with botox injection and biopsies       Anesthesia Type:  MAC    Note:  Disposition: Inpatient   Postop Pain Control: Uneventful            Sign Out: Well controlled pain   PONV: No   Neuro/Psych: Uneventful            Sign Out: Acceptable/Baseline neuro status   Airway/Respiratory: Uneventful            Sign Out: Acceptable/Baseline resp. status   CV/Hemodynamics: Uneventful            Sign Out: Acceptable CV status; No obvious hypovolemia; No obvious fluid overload   Other NRE: NONE   DID A NON-ROUTINE EVENT OCCUR? No           Last vitals:  Vitals Value Taken Time   /66 02/22/24 1100   Temp 36.6  C (97.9  F) 02/22/24 1049   Pulse 66 02/22/24 1125   Resp 20 02/22/24 1049   SpO2 95 % 02/22/24 1125   Vitals shown include unfiled device data.    Electronically Signed By: Luis Armando Pollack MD  February 22, 2024  11:30 AM

## 2024-02-22 NOTE — PLAN OF CARE
Paged MD to get a 1:1 sitter because pt kept jumping out of bed and is not redirectable and confused. MD ordered. Call light within reach. Pt assist of 1 with walker and gait belt. LR running at 75 mL per hour. Called speech and they went home for the day to see if they can assess pt to get a different diet order. Pt still NPO updated [facility].     Problem: Fall Injury Risk  Goal: Absence of Fall and Fall-Related Injury  Outcome: Progressing  Intervention: Identify and Manage Contributors  Recent Flowsheet Documentation  Taken 2/22/2024 1425 by Ciera Perez, RN  Medication Review/Management: medications reviewed  Intervention: Promote Injury-Free Environment  Recent Flowsheet Documentation  Taken 2/22/2024 1425 by Ciera Perez, RN  Safety Promotion/Fall Prevention:   activity supervised   assistive device/personal items within reach   clutter free environment maintained   increased rounding and observation   increase visualization of patient   lighting adjusted   mobility aid in reach   nonskid shoes/slippers when out of bed   room near nurse's station   room organization consistent   safety round/check completed     Problem: Swallowing Impairment  Goal: Optimal Eating and Swallowing Without Aspiration  Outcome: Progressing   Goal Outcome Evaluation:                         Patient calls complaining of left side stomach pain that started at 3am this morning. States pain woke him up out of a sleep. Call forwarded to triage for further review.

## 2024-02-22 NOTE — ANESTHESIA PREPROCEDURE EVALUATION
Anesthesia Pre-Procedure Evaluation    Patient: Kim Dunn   MRN: [id number] : 1928        Procedure : Procedure(s):  ESOPHAGOGASTRODUODENOSCOPY          Past Medical History:   Diagnosis Date    Gastroesophageal reflux disease     Hypertension       Past Surgical History:   Procedure Laterality Date    ABDOMINAL HYSTERECTOMY      CHOLECYSTECTOMY      ESOPHAGOGASTRODUODENOSCOPY, WITH BOTULINUM TOXIN INJECTION N/A 2023    Procedure: ESOPHAGOGASTRODUODENOSCOPY WITH BOTOX;  Surgeon: Nolan Howe MD;  Location: [facility] OR    ESOPHAGOSCOPY, GASTROSCOPY, DUODENOSCOPY (EGD), COMBINED N/A 4/3/2015    Procedure: UPPER ENDOSCOPY WITH injection of BOTOX;  Surgeon: Loi Alberto MD;  Location: [facility];  Service:       Allergies   Allergen Reactions    Penicillins Hives    Seafood     Tetracycline Unknown      Social History     Tobacco Use    Smoking status: Never    Smokeless tobacco: Not on file   Substance Use Topics    Alcohol use: No      Wt Readings from Last 1 Encounters:   24 44.5 kg (98 lb)        Anesthesia Evaluation            ROS/MED HX  ENT/Pulmonary: Comment: Aspiration pneumonia     (+)                              recent URI,          Neurologic:    (-) no CVA   Cardiovascular:     (+)  hypertension- -   -  - -                           valvular problems/murmurs type: MR TR.   pulmonary hypertension, Previous cardiac testing   Echo: Date: 24 Results:  Interpretation Summary     1. Normal left ventricular size and systolic performance with a visually  estimated ejection fraction of 70%.  2. There is mild aortic insufficiency.  3. There is moderate to severe mitral insufficiency.  4. There is moderate tricuspid insufficiency.  5. Normal right ventricular size and systolic performance.  6. There is severe left atrial enlargement. There is mild to moderate right  atrial enlargement.  7. Right ventricular systolic pressure relative to right atrial pressure  "is  moderately increased. The pulmonary artery pressure is estimated to be 60-65  mmHg plus right atrial pressure (IVC is mildly dilated).    Stress Test:  Date: Results:    ECG Reviewed:  Date: Results:    Cath:  Date: Results:      METS/Exercise Tolerance:     Hematologic:     (+)      anemia,          Musculoskeletal: Comment: PMR      GI/Hepatic:     (+) GERD,                   Renal/Genitourinary:     (+) renal disease, type: CRI, Pt does not require dialysis,           Endo:  - neg endo ROS     Psychiatric/Substance Use:  - neg psychiatric ROS     Infectious Disease:  - neg infectious disease ROS     Malignancy:  - neg malignancy ROS     Other:  - neg other ROS          Physical Exam    Airway  airway exam normal      Mallampati: III   TM distance: > 3 FB   Neck ROM: full   Mouth opening: > 3 cm    Respiratory Devices and Support         Dental  no notable dental history     (+) Modest Abnormalities - crowns, retainers, 1 or 2 missing teeth      Cardiovascular   cardiovascular exam normal       Rhythm and rate: regular and normal   (+) murmur       Pulmonary   pulmonary exam normal        breath sounds clear to auscultation           OUTSIDE LABS:  CBC:   Lab Results   Component Value Date    WBC 7.3 02/22/2024    WBC 6.4 02/21/2024    HGB 9.9 (L) 02/22/2024    HGB 10.4 (L) 02/21/2024    HCT 31.2 (L) 02/22/2024    HCT 32.6 (L) 02/21/2024     02/22/2024     02/21/2024     BMP:   Lab Results   Component Value Date     02/22/2024     02/21/2024    POTASSIUM 3.4 02/22/2024    POTASSIUM 3.7 02/21/2024    CHLORIDE 105 02/22/2024    CHLORIDE 105 02/21/2024    CO2 21 (L) 02/22/2024    CO2 24 02/21/2024    BUN 18.0 02/22/2024    BUN 23.6 (H) 02/21/2024    CR 0.76 02/22/2024    CR 0.79 02/21/2024    GLC 98 02/22/2024     (H) 02/21/2024     COAGS: No results found for: \"PTT\", \"INR\", \"FIBR\"  POC: No results found for: \"BGM\", \"HCG\", \"HCGS\"  HEPATIC:   Lab Results   Component Value Date    " ALBUMIN 3.3 (L) 02/21/2024    PROTTOTAL 7.0 02/21/2024    ALT <5 02/21/2024    AST 20 02/21/2024    ALKPHOS 57 02/21/2024    BILITOTAL 0.3 02/21/2024     OTHER:   Lab Results   Component Value Date    NEHA 9.1 02/22/2024    MAG 2.0 02/20/2024    TSH 1.54 02/21/2024    SED 42 (H) 06/21/2019       Anesthesia Plan    ASA Status:  4    NPO Status:  NPO Appropriate    Anesthesia Type: MAC.     - Reason for MAC: straight local not clinically adequate              Consents    Anesthesia Plan(s) and associated risks, benefits, and realistic alternatives discussed. Questions answered and patient/representative(s) expressed understanding.     - Discussed:     - Discussed with:  Patient            Postoperative Care    Pain management: IV analgesics, Oral pain medications, Multi-modal analgesia.   PONV prophylaxis: Ondansetron (or other 5HT-3), Dexamethasone or Solumedrol, Droperidol or Haldol     Comments:               Luis Armando Pollack MD    I have reviewed the pertinent notes and labs in the chart from the past 30 days and (re)examined the patient.  Any updates or changes from those notes are reflected in this note.      # Hypercalcemia: Highest Ca = 10.9 mg/dL in last 2 days, will monitor as appropriate     # Hypoalbuminemia: Lowest albumin = 3.3 g/dL at 2/21/2024  5:46 AM, will monitor as appropriate

## 2024-02-22 NOTE — PROGRESS NOTES
Keep holding po meds given esophageal stricture and risk for aspiration, will add prn hydralazine iv for BP control.  Willian Gross MD .......... February 21, 2024, 9:07 PM

## 2024-02-22 NOTE — PROVIDER NOTIFICATION
Paged Dr. Gross, Patient has a specific order for NPO except for ice chips. It does not mention anything about oral medication administration. Would you like me to hold her oral meds for the evening? Please advise. Thanks   Katarzyna Jain RN on 2/21/2024 at 9:04 PM    Ordered to hold oral medications. Provider will order Hydralazine for BP PRN.   Katarzyna Jain RN on 2/21/2024 at 9:07 PM

## 2024-02-22 NOTE — PROGRESS NOTES
"PRIMARY DIAGNOSIS: \"GENERIC\" NURSING  OUTPATIENT/OBSERVATION GOALS TO BE MET BEFORE DISCHARGE:  ADLs back to baseline: No    Activity and level of assistance: Up with standby assistance.    Pain status: Pain free.    Return to near baseline physical activity: No     Discharge Planner Nurse   Safe discharge environment identified: Yes  Barriers to discharge: Yes       Entered by: Katarzyna Jain RN 02/22/2024 4:32 AM     Please review provider order for any additional goals.   Nurse to notify provider when observation goals have been met and patient is ready for discharge.  "

## 2024-02-22 NOTE — UTILIZATION REVIEW
Admission Status; Secondary Review Determination   Under the authority of the Utilization Management Committee, the utilization review process indicated a secondary review on Kim Dunn. The review outcome is based on review of the medical records, discussions with staff, and applying clinical experience noted on the date of the review.   (x) Inpatient Status Appropriate - This patient's medical care is consistent with medical management for inpatient care and reasonable inpatient medical practice.     RATIONALE FOR DETERMINATION   Kim Dunn is a 89 yr old female with advanced achalasia, GERD, esophagitis, esophageal diverticulum, recurrent aspiration, HTN, CKD who presented with coughing and choking with swallowing on 2/20.  CT with recurrent aspiration.  Currently IV abx.  EGD with treatment of the diverticulum.  Discussed with Dr. Lees and still NPO as needs assessing by speech for safe oral intake.  Will cross 3rd night.    At the time of admission with the information available to the attending physician more than 2 nights Hospital complex care was anticipated, based on patient risk of adverse outcome if treated as outpatient and complex care required. Inpatient admission is appropriate based on the Medicare guidelines.   The information on this document is developed by the utilization review team in order for the business office to ensure compliance. This only denotes the appropriateness of proper admission status and does not reflect the quality of care rendered.   The definitions of Inpatient Status and Observation Status used in making the determination above are those provided in the CMS Coverage Manual, Chapter 1 and Chapter 6, section 70.4.   Sincerely,   Trini Landin MD  Utilization Review  Physician Advisor  [facility]

## 2024-02-22 NOTE — ANESTHESIA CARE TRANSFER NOTE
Patient: Kim Dunn    Procedure: Procedure(s):  ESOPHAGOGASTRODUODENOSCOPY with botox injection and biopsies       Diagnosis: Achalasia [K22.0]  Diagnosis Additional Information: No value filed.    Anesthesia Type:   MAC     Note:    Oropharynx: spontaneously breathing and oropharynx clear of all foreign objects  Level of Consciousness: awake  Oxygen Supplementation: face mask  Level of Supplemental Oxygen (L/min / FiO2): 6  Independent Airway: airway patency satisfactory and stable  Dentition: dentition unchanged  Vital Signs Stable: post-procedure vital signs reviewed and stable  Report to RN Given: handoff report given  Patient transferred to: Phase II    Handoff Report: Identifed the Patient, Identified the Reponsible Provider, Reviewed the pertinent medical history, Discussed the surgical course, Reviewed Intra-OP anesthesia mangement and issues during anesthesia, Set expectations for post-procedure period and Allowed opportunity for questions and acknowledgement of understanding      Vitals:  Vitals Value Taken Time   /65 02/22/24 1049   Temp 36.6  C (97.9  F) 02/22/24 1049   Pulse 60 02/22/24 1050   Resp 20 02/22/24 1049   SpO2 100 % 02/22/24 1050   Vitals shown include unfiled device data.    Electronically Signed By: ROCHELLE Cote CRNA  February 22, 2024  10:51 AM

## 2024-02-22 NOTE — PROGRESS NOTES
Pt disoriented this morning. Writer went to pt room and she was standing at the end of her bed confused. IV pulled out. Redirection and reassurrance provided. Pt taken to the restroom via SBA with gait belt. Voiding spontaneously. Did have small BM this morning. No complaints of N/V/H. Denies chest pain or SOB. NSR on tele. VSS on RA. After reorientation pt came to and able to make needs known but forgetful at times. Grandson at the bedside. Pt sent to [facility] for EGD this morning. Report given to [facility] nurse.     Katrina Zhu RN

## 2024-02-22 NOTE — PROGRESS NOTES
[facility] MEDICINE PROGRESS NOTE      Identification/Summary: Kim Dunn is a 89 year old female   PMHx: Advanced achalasia, GERD, esophagitis, esophageal diverticulum, recurrent aspiration, HTN, CKD 3    Hospital Course   admitted on for coughing and choking with swallowing, some shortness of breath 2/20/2024 for recurrent aspiration pneumonia, for speech and GI evaluation.  CT chest abdomen pelvis with contrast shows findings consistent with recurrent aspiration pneumonia, thyroid nodule.    She has abnormal EKG with ST-T wave changes, no chest pain, troponin mildly elevated in 20s.  Echocardiogram with EF of 70%, moderate to severe MR pulmonary hypertension  2/22/24 - EGD showed Zenker's diverticulum in the middle third of the esophagus. Achalasia was injected with botulinum toxin. Nodular mucosa in the stomach was biopsied.     Assessment & Plan   Recurrent aspiration pneumonia: Remains afebrile on cefepime.  N.p.o. plan to advance diet per speech therapy recommendations.    Zenker's diverticulum: Visualized on EGD along with achalasia which was treated with botulinum toxin.    Nodular stomach mucosa: Biopsies pending    Metabolic acidosis: Secondary to saline.  Switch to LR.    Essential hypertension: Amlodipine 5 mg daily, losartan 50 mg daily    Moderate/severe mitral regurgitation: Unlikely she would be a good surgical candidate    Pulmonary hypertension: Possibly class III given CT findings    Generalized weakness: PT/OT consulted    CKD 3a: At baseline    Cachexia: RD consulted previous    Normocytic anemia: Believe 1.5 hemoglobin drop most likely due to volume replacement    Thyroid nodule: TSH reassuring, outpatient management    Goals of care: Grandson believes patient is DNR with prearrest intubation okay.  Told me that they scanned her advanced directive in the emergency department.  After conversation I reviewed the chart and unable to locate the scanned documents.   Will ask for clarification tomorrow.       Discharge: Pending diet tolerance  DVT Prophylaxis:  Heparin SQ  Code Status: Full Code  Diet: NPO for Medical/Clinical Reasons Except for: Meds  Cardiac monitor: None  Body mass index is 21.97 kg/m .    Interval History  Gathered history with patient and her grandson, Jimmy who is an ICU RN.  Grandson assists with history given patient's hearing loss.  He states that she has resolved some signs recently of return memory loss/cognitive decline.  Patient denies acute complaints.  Denies pleuritic pain, chest pain or shortness of breath.  Says she has been more productive of sputum in the past week or 2.  Somewhat hypertensive, afebrile, stable on room air pulse ox 95%    Cachectic female, difficulty answering questions mostly due to hearing loss  Pleasant, normal affect  Disoriented to place, roughly oriented to time and self  Regular rate and rhythm, murmur present  Base lung crackles, no wheeze  Abdomen soft, nontender  Moving all 4 extremities    Last 24H PRN:     diclofenac (VOLTAREN) 1 % topical gel 2 g, 2 g at 02/22/24 0202    No results found for this or any previous visit (from the past 24 hour(s)).  Wt Readings from Last 5 Encounters:   02/22/24 44.5 kg (98 lb)   01/04/23 40.8 kg (90 lb)   04/03/15 49.9 kg (110 lb)     Hollis Lees MD, MPH  Hospitalist,[facility]  [facility]: [facility]  Phone: #[phone]    Medications:   Personally Reviewed.  Medications    lactated ringers 75 mL/hr at 02/22/24 1506      amLODIPine  5 mg Oral At Bedtime    ceFEPIme  2 g Intravenous Q24H    docusate sodium  100 mg Oral Daily    gabapentin  100 mg Oral BID    heparin ANTICOAGULANT  5,000 Units Subcutaneous Q8H    losartan  50 mg Oral Daily    pantoprazole  40 mg Intravenous BID       Clinically Significant Risk Factors Present on Admission           # Hypercalcemia: Highest Ca = 10.9 mg/dL in last 2 days, will monitor as appropriate    # Hypoalbuminemia:  Lowest albumin = 3.3 g/dL at 2/21/2024  5:46 AM, will monitor as appropriate     # Hypertension: Noted on problem list

## 2024-02-22 NOTE — PROVIDER NOTIFICATION
Paged Dr. Burleson, Patient is complaining of aching pain in left shoulder and hip and is requesting some cream to help with her pain. Are you able to order some cream to help with her pain? Please advise.  Katarzyna Jain RN on 2/22/2024 at 1:21 AM    Voltaren gel ordered by provider.   Katarzyna Jain RN on 2/22/2024 at 1:24 AM

## 2024-02-23 ENCOUNTER — APPOINTMENT (OUTPATIENT)
Dept: PHYSICAL THERAPY | Facility: CLINIC | Age: 89
DRG: 177 | End: 2024-02-23
Attending: HOSPITALIST
Payer: MEDICARE

## 2024-02-23 ENCOUNTER — APPOINTMENT (OUTPATIENT)
Dept: RADIOLOGY | Facility: CLINIC | Age: 89
DRG: 177 | End: 2024-02-23
Attending: HOSPITALIST
Payer: MEDICARE

## 2024-02-23 ENCOUNTER — APPOINTMENT (OUTPATIENT)
Dept: OCCUPATIONAL THERAPY | Facility: CLINIC | Age: 89
DRG: 177 | End: 2024-02-23
Attending: HOSPITALIST
Payer: MEDICARE

## 2024-02-23 ENCOUNTER — APPOINTMENT (OUTPATIENT)
Dept: SPEECH THERAPY | Facility: CLINIC | Age: 89
DRG: 177 | End: 2024-02-23
Payer: MEDICARE

## 2024-02-23 LAB
PATH REPORT.COMMENTS IMP SPEC: NORMAL
PATH REPORT.FINAL DX SPEC: NORMAL
PATH REPORT.GROSS SPEC: NORMAL
PATH REPORT.MICROSCOPIC SPEC OTHER STN: NORMAL
PATH REPORT.RELEVANT HX SPEC: NORMAL
PHOTO IMAGE: NORMAL

## 2024-02-23 PROCEDURE — 250N000013 HC RX MED GY IP 250 OP 250 PS 637: Performed by: HOSPITALIST

## 2024-02-23 PROCEDURE — 250N000013 HC RX MED GY IP 250 OP 250 PS 637: Performed by: INTERNAL MEDICINE

## 2024-02-23 PROCEDURE — 97535 SELF CARE MNGMENT TRAINING: CPT | Mod: GO

## 2024-02-23 PROCEDURE — 92526 ORAL FUNCTION THERAPY: CPT | Mod: GN | Performed by: SPEECH-LANGUAGE PATHOLOGIST

## 2024-02-23 PROCEDURE — 73502 X-RAY EXAM HIP UNI 2-3 VIEWS: CPT

## 2024-02-23 PROCEDURE — 120N000001 HC R&B MED SURG/OB

## 2024-02-23 PROCEDURE — 88342 IMHCHEM/IMCYTCHM 1ST ANTB: CPT | Mod: 26 | Performed by: PATHOLOGY

## 2024-02-23 PROCEDURE — 88305 TISSUE EXAM BY PATHOLOGIST: CPT | Mod: 26 | Performed by: PATHOLOGY

## 2024-02-23 PROCEDURE — 97116 GAIT TRAINING THERAPY: CPT | Mod: GP

## 2024-02-23 PROCEDURE — C9113 INJ PANTOPRAZOLE SODIUM, VIA: HCPCS | Performed by: INTERNAL MEDICINE

## 2024-02-23 PROCEDURE — 250N000011 HC RX IP 250 OP 636: Performed by: INTERNAL MEDICINE

## 2024-02-23 PROCEDURE — 250N000011 HC RX IP 250 OP 636: Mod: JZ | Performed by: INTERNAL MEDICINE

## 2024-02-23 PROCEDURE — 97161 PT EVAL LOW COMPLEX 20 MIN: CPT | Mod: GP

## 2024-02-23 PROCEDURE — 99232 SBSQ HOSP IP/OBS MODERATE 35: CPT | Performed by: HOSPITALIST

## 2024-02-23 PROCEDURE — 258N000003 HC RX IP 258 OP 636: Performed by: HOSPITALIST

## 2024-02-23 PROCEDURE — 97166 OT EVAL MOD COMPLEX 45 MIN: CPT | Mod: GO

## 2024-02-23 RX ORDER — MULTIPLE VITAMINS W/ MINERALS TAB 9MG-400MCG
1 TAB ORAL DAILY
Status: DISCONTINUED | OUTPATIENT
Start: 2024-02-23 | End: 2024-02-27 | Stop reason: HOSPADM

## 2024-02-23 RX ORDER — ACETAMINOPHEN 325 MG/1
650 TABLET ORAL EVERY 4 HOURS PRN
Status: DISCONTINUED | OUTPATIENT
Start: 2024-02-23 | End: 2024-02-27 | Stop reason: HOSPADM

## 2024-02-23 RX ORDER — ACETAMINOPHEN 650 MG/1
650 SUPPOSITORY RECTAL EVERY 4 HOURS PRN
Status: DISCONTINUED | OUTPATIENT
Start: 2024-02-23 | End: 2024-02-27 | Stop reason: HOSPADM

## 2024-02-23 RX ORDER — CARBOXYMETHYLCELLULOSE SODIUM 5 MG/ML
1 SOLUTION/ DROPS OPHTHALMIC
Status: DISCONTINUED | OUTPATIENT
Start: 2024-02-23 | End: 2024-02-27 | Stop reason: HOSPADM

## 2024-02-23 RX ADMIN — PANTOPRAZOLE SODIUM 40 MG: 40 INJECTION, POWDER, FOR SOLUTION INTRAVENOUS at 09:32

## 2024-02-23 RX ADMIN — HEPARIN SODIUM 5000 UNITS: 5000 INJECTION, SOLUTION INTRAVENOUS; SUBCUTANEOUS at 21:01

## 2024-02-23 RX ADMIN — GABAPENTIN 100 MG: 100 CAPSULE ORAL at 21:01

## 2024-02-23 RX ADMIN — GABAPENTIN 100 MG: 100 CAPSULE ORAL at 09:32

## 2024-02-23 RX ADMIN — CEFEPIME 2 G: 2 INJECTION, POWDER, FOR SOLUTION INTRAVENOUS at 04:11

## 2024-02-23 RX ADMIN — SODIUM CHLORIDE, POTASSIUM CHLORIDE, SODIUM LACTATE AND CALCIUM CHLORIDE: 600; 310; 30; 20 INJECTION, SOLUTION INTRAVENOUS at 20:15

## 2024-02-23 RX ADMIN — AMLODIPINE BESYLATE 5 MG: 5 TABLET ORAL at 21:01

## 2024-02-23 RX ADMIN — LOSARTAN POTASSIUM 50 MG: 50 TABLET, FILM COATED ORAL at 09:32

## 2024-02-23 RX ADMIN — Medication 1 TABLET: at 14:21

## 2024-02-23 RX ADMIN — HEPARIN SODIUM 5000 UNITS: 5000 INJECTION, SOLUTION INTRAVENOUS; SUBCUTANEOUS at 14:21

## 2024-02-23 RX ADMIN — ACETAMINOPHEN 650 MG: 325 TABLET ORAL at 21:01

## 2024-02-23 RX ADMIN — PANTOPRAZOLE SODIUM 40 MG: 40 INJECTION, POWDER, FOR SOLUTION INTRAVENOUS at 21:01

## 2024-02-23 RX ADMIN — Medication 1 DROP: at 18:39

## 2024-02-23 RX ADMIN — HEPARIN SODIUM 5000 UNITS: 5000 INJECTION, SOLUTION INTRAVENOUS; SUBCUTANEOUS at 04:11

## 2024-02-23 RX ADMIN — SODIUM CHLORIDE, POTASSIUM CHLORIDE, SODIUM LACTATE AND CALCIUM CHLORIDE: 600; 310; 30; 20 INJECTION, SOLUTION INTRAVENOUS at 04:11

## 2024-02-23 ASSESSMENT — ACTIVITIES OF DAILY LIVING (ADL)
ADLS_ACUITY_SCORE: 30
ADLS_ACUITY_SCORE: 29
ADLS_ACUITY_SCORE: 30
ADLS_ACUITY_SCORE: 29
ADLS_ACUITY_SCORE: 30
ADLS_ACUITY_SCORE: 29
ADLS_ACUITY_SCORE: 29
ADLS_ACUITY_SCORE: 30
ADLS_ACUITY_SCORE: 29
ADLS_ACUITY_SCORE: 30
ADLS_ACUITY_SCORE: 29
ADLS_ACUITY_SCORE: 29
ADLS_ACUITY_SCORE: 30

## 2024-02-23 NOTE — PLAN OF CARE
Goal Outcome Evaluation:    VSS on RA. Pt is confused and oriented to self only. 1:1 in place. Assist of 1.  Pt NPO pending speech re-evaluation of swallow eval. IV saline locked. Voids spontaneously. Denies pain. Alarms on.

## 2024-02-23 NOTE — PROGRESS NOTES
"   02/23/24 1306   Appointment Info   Signing Clinician's Name / Credentials (OT) Sonia Monte, KLAUS/L, CLT   Rehab Comments (OT) OT eval   Living Environment   People in Home alone   Current Living Arrangements apartment   Home Accessibility no concerns   Self-Care   Usual Activity Tolerance moderate   Current Activity Tolerance fair   Equipment Currently Used at Home grab bar, toilet;grab bar, tub/shower  (pt unsure of equipment at home)   Fall history within last six months no   Activity/Exercise/Self-Care Comment Pt is typically independent with ADL at home and IADL, except driving.   General Information   Onset of Illness/Injury or Date of Surgery 02/20/24   Referring Physician Dr. Lees   Patient/Family Therapy Goal Statement (OT) wants to change underwear   Additional Occupational Profile Info/Pertinent History of Current Problem Presented to  ED with trouble swallowing, coughing/choking, and poor oral intake. She'd been feeling food \"get stuck\" in neck/chest. Initial workup showed mild troponin elevation and nonspecific ST-T changes on EKG. CT chest/abd/pelvis showed fluid in the thoracic esophagus.  Also seen was nonspecific interstitial pneumonia and reactive mediastinal lymphadenopathy. Thyroid nodule also noted.   Limitations/Impairments hearing;safety/cognitive   Cognitive Status Examination   Orientation Status person;place   Affect/Mental Status (Cognitive) confused   Memory Deficit moderate deficit   Attention Deficit divided attention   Visual Perception   Visual Impairment/Limitations WFL   Sensory   Sensory Quick Adds sensation intact   Pain Assessment   Patient Currently in Pain No   Posture   Posture not impaired   Range of Motion Comprehensive   General Range of Motion no range of motion deficits identified   Strength Comprehensive (MMT)   Comment, General Manual Muscle Testing (MMT) Assessment generalized weakness   Muscle Tone Assessment   Muscle Tone Quick Adds No deficits were identified "   Coordination   Upper Extremity Coordination No deficits were identified   Bed Mobility   Comment (Bed Mobility) min A   Transfers   Transfer Comments min A   Balance   Balance Comments decreased   Activities of Daily Living   BADL Assessment/Intervention lower body dressing;grooming;toileting   Lower Body Dressing Assessment/Training   [place] Level (Lower Body Dressing) minimum assist (75% patient effort)   Grooming Assessment/Training   [place] Level (Grooming) contact guard assist   Toileting   [place] Level (Toileting) contact guard assist   Clinical Impression   Criteria for Skilled Therapeutic Interventions Met (OT) Yes, treatment indicated   OT Diagnosis decreased ADL independence/safety.   Influenced by the following impairments aspiration into airway   OT Problem List-Impairments impacting ADL activity tolerance impaired;balance;cognition;mobility;strength   Assessment of Occupational Performance 5 or more Performance Deficits   Identified Performance Deficits dressing, toileting, bathing, functional mobility, bed mobility   Planned Therapy Interventions (OT) ADL retraining;cognition;transfer training;strengthening;progressive activity/exercise   Clinical Decision Making Complexity (OT) detailed assessment/moderate complexity   Risk & Benefits of therapy have been explained care plan/treatment goals reviewed;patient   OT Total Evaluation Time   OT Eval, Moderate Complexity Minutes (44324) 10   OT Goals   Therapy Frequency (OT) 5 times/week   OT Predicted Duration/Target Date for Goal Attainment 02/29/24   OT Goals Lower Body Dressing;Toilet Transfer/Toileting;Cognition   OT: Lower Body Dressing Modified independent   OT: Toilet Transfer/Toileting Modified independent   OT: Cognitive Patient/caregiver will verbalize understanding of cognitive assessment results/recommendations as needed for safe discharge planning   Interventions   Interventions Quick Adds Self-Care/Home Management    Self-Care/Home Management   Self-Care/Home Mgmt/ADL, Compensatory, Meal Prep Minutes (88221) 15   Symptoms Noted During/After Treatment (Meal Preparation/Planning Training) fatigue;shortness of breath   Treatment Detail/Skilled Intervention all transfers with CGA/FWW/cues for tech including bed, toilet and chair (with rails). Bed mob with min A/rails/cues for hand placement. Walked to BR about 15' and toileting with SBA/rail/cues for tech and doffed/donned underwear and needed cues for safety d/t decr cog. Stood for LB bathing with CGA/rail. walked in hallway and was at 94% on RA after rest. Pt needed incr time/effort/cog second to weakness/decreased cog.   OT Discharge Planning   OT Plan ACLS; any cog with ADL   OT Discharge Recommendation (DC Rec) (S)    (24 hour supervision)   OT Rationale for DC Rec pt has some confusion and decreased cog and needs supervision for safety   OT Brief overview of current status CGA for BADL and cues d/t safety   Total Session Time   Timed Code Treatment Minutes 15   Total Session Time (sum of timed and untimed services) 25

## 2024-02-23 NOTE — PLAN OF CARE
Problem: Adult Inpatient Plan of Care  Goal: Optimal Comfort and Wellbeing  2/23/2024 0424 by Radha Prieto, RN  Outcome: Progressing  2/22/2024 2223 by Radha Prieto, RN  Outcome: Progressing   Goal Outcome Evaluation:    VSS on RA. Pt is confused and oriented to self only. 1:1 in place. Assist of 1.  Pt NPO pending speech re-evaluation of swallow eval. IV saline locked. Voids spontaneously. Denies pain. Alarms on.

## 2024-02-23 NOTE — CONSULTS
"CLINICAL NUTRITION SERVICES - ASSESSMENT NOTE     Nutrition Prescription    RECOMMENDATIONS FOR MDs/PROVIDERS TO ORDER:  Recommend thiamin x10 days with malnutrition   Recommend diet advance vs nutrition support 1-2 days with NPO/poor po x5 days.    Malnutrition Status:    Severe malnutrition  In Context of:  Acute illness or injury    Recommendations already ordered by Registered Dietitian (RD):  MVI/M daily d/t inadequate oral intakes   New weight     Future/Additional Recommendations:  Will monitor diet advance vs nutrition support, wt, labs, BM, GOC.      REASON FOR ASSESSMENT  Kim Dunn is a/an 89 year old female assessed by the dietitian for Provider Order x2- malnutrition and low BMI, aspiration     HPI: Pt with history of advanced achalasia, GERD, esophagitis, esophageal diverticulum, recurrent aspiration, hypertension, CKD 3 who was admitted on for coughing and choking with swallowing, some shortness of breath 2/20/2024 for recurrent aspiration pneumonia, for speech and GI evaluation.  CT chest abdomen pelvis with contrast shows findings consistent with recurrent aspiration pneumonia, thyroid nodule.      NUTRITION HISTORY  Met with pt and pt family at bedside this AM. Pt/family report poor po intakes for at least 5 days now. Pt states she has not had a real meal in over 5 days. Pt family notes pt with weight loss, appears thinner than baseline. Pt noting worsened swallowing leading to admit.   Food Allergies: Seafood     Pt seen by speech, recommended thin water and ice chips for pleasure only following thorough oral cares.     CURRENT NUTRITION ORDERS  Diet: NPO    LABS  Reviewed    MEDICATIONS  Reviewed   Continuous LR 75 ml/hr   Scheduled norvasc, colace, neurontin, cozaar, protonix     ANTHROPOMETRICS  Height: 142.2 cm (4' 8\")  Most Recent Weight: 44.5 kg (98 lb)    IBW: 40.9 kg  BMI: Normal BMI  Weight History: Limited weight hx, no losses per chart review  Wt Readings from Last 10 Encounters: "   02/22/24 44.5 kg (98 lb)   01/04/23 40.8 kg (90 lb)   04/03/15 49.9 kg (110 lb)   12/14/22  41.3 kg (91 lb)     Dosing Weight: 44.5 kg    ASSESSED NUTRITION NEEDS  Estimated Energy Needs: 6631-5064 kcals/day (25 - 30+ kcals/kg)  Justification: Maintenance  Estimated Protein Needs: 35-45 grams protein/day (0.8 - 1 grams of pro/kg)  Justification: CKD and Repletion  Estimated Fluid Needs: 1112 mL/day (25 mL/kg)   Justification: Maintenance    PHYSICAL FINDINGS/GI CONCERNS  See malnutrition section below.  Per Flowsheets:   X10 UOP 2/22   X1 BM 2/22     MALNUTRITION:  % Weight Loss:  None noted  % Intake:  </= 50% for >/= 5 days (severe malnutrition)  Subcutaneous Fat Loss:  Orbital region moderate depletion and Upper arm region moderate depletion  Muscle Loss:  Temporal region moderate depletion, Clavicle bone region severe depletion, Acromion bone region severe depletion, Scapular bone region severe depletion, and Dorsal hand region moderate depletion  Fluid Retention:  None noted    Malnutrition Diagnosis: Severe malnutrition  In Context of:  Acute illness or injury    NUTRITION DIAGNOSIS  Malnutrition related to trouble swallowing as evidenced by inadequate oral intakes for at least x5 days, muscle and fat losses.       INTERVENTIONS  Implementation  MVI/M daily d/t inadequate oral intakes  New weight    Recommend thiamin x10 days with malnutrition     Goals  Pt to meet nutritional needs   Diet advance vs Nutrition Support 1-2 days   Maintain weight >/= 98 kg      Monitoring/Evaluation  Progress toward goals will be monitored and evaluated per protocol.

## 2024-02-23 NOTE — PROGRESS NOTES
02/23/24 6305   Appointment Info   Signing Clinician's Name / Credentials (PT) Hollis Robbins, PT, DPT   Living Environment   People in Home alone   Current Living Arrangements apartment   Home Accessibility no concerns   Self-Care   Usual Activity Tolerance good   Current Activity Tolerance moderate   Equipment Currently Used at Home none   Fall history within last six months yes   Number of times patient has fallen within last six months 2   General Information   Onset of Illness/Injury or Date of Surgery 02/20/24   Referring Physician Dr. Lees   Patient/Family Therapy Goals Statement (PT) Increase mobility   Pertinent History of Current Problem (include personal factors and/or comorbidities that impact the POC) Gen weakness, aspiration   Existing Precautions/Restrictions fall   Weight-Bearing Status - LLE full weight-bearing   Weight-Bearing Status - RLE full weight-bearing   Range of Motion (ROM)   ROM Comment WFL   Strength (Manual Muscle Testing)   Strength Comments WFL   Bed Mobility   Bed Mobility supine-sit   Supine-Sit Dunn (Bed Mobility) minimum assist (75% patient effort)   Impairments Contributing to Impaired Bed Mobility decreased strength   Assistive Device (Bed Mobility) bed rails   Transfers   Transfers sit-stand transfer   Sit-Stand Transfer   Sit-Stand Dunn (Transfers) contact guard   Assistive Device (Sit-Stand Transfers) walker, front-wheeled   Gait/Stairs (Locomotion)   Dunn Level (Gait) contact guard   Assistive Device (Gait) walker, front-wheeled   Distance in Feet (Gait) 10'   Pattern (Gait) step-through   Deviations/Abnormal Patterns (Gait) darrick decreased;stride length decreased   Clinical Impression   Criteria for Skilled Therapeutic Intervention Yes, treatment indicated   PT Diagnosis (PT) impaired functional mobility   Influenced by the following impairments weakness   Functional limitations due to impairments gait, transfers   Clinical Presentation (PT  Evaluation Complexity) stable   Clinical Presentation Rationale pt presents as medically diagnosed   Clinical Decision Making (Complexity) low complexity   Planned Therapy Interventions (PT) gait training;home exercise program;patient/family education;ROM (range of motion);strengthening;transfer training   Risk & Benefits of therapy have been explained care plan/treatment goals reviewed;patient   PT Total Evaluation Time   PT Eval, Low Complexity Minutes (57736) 10   Physical Therapy Goals   PT Frequency 5x/week   PT Predicted Duration/Target Date for Goal Attainment 02/28/24   PT Goals Transfers;Gait   PT: Transfers Supervision/stand-by assist;Sit to/from stand   PT: Gait Supervision/stand-by assist;150 feet  (LRAD)   Interventions   Interventions Quick Adds Gait Training;Therapeutic Activity   Therapeutic Activity   Therapeutic Activities: dynamic activities to improve functional performance Minutes (91558) 5   Symptoms Noted During/After Treatment Fatigue   Treatment Detail/Skilled Intervention Supine to sit very Min A to get upright to EOB where pt is SBA, used bed rail. Sit<>stand multiple times SBA, cues for hand placement and safety, increased time for set up, no concerns in this area.   Gait Training   Gait Training Minutes (46538) 10   Symptoms Noted During/After Treatment (Gait Training) fatigue;increased pain   Treatment Detail/Skilled Intervention Pt amb moderately well in the halls using FWW and CGA for safety, decreased darrick, a couple very short standing breaks to stretch out back. PT managing IV, cues for navigation. Baseline no AD, no LOB or adverse s/s other than RUE pain pt reports from being in bed. Pt fatigued after amb with O2 sats at 94%.   Distance in Feet 150'   [place] Level (Gait Training) contact guard   Physical Assistance Level (Gait Training) verbal cues;supervision   Weight Bearing (Gait Training) full weight-bearing   Assistive Device (Gait Training) rolling walker   Pattern  Analysis (Gait Training) swing-through gait   Gait Analysis Deviations decreased darrick;decreased step length   Impairments (Gait Analysis/Training) strength decreased   PT Discharge Planning   PT Plan Amb with LRAD   PT Discharge Recommendation (DC Rec) (S)  home with assist;Transitional Care Facility   PT Rationale for DC Rec Pt mobilizing well, below baseline, pending confusion clearing and further mobility assessment, may be able to return home with increased support or TCU for short term.   PT Brief overview of current status CGA transfers and amb 150' with FWW   PT Equipment Needed at Discharge   (Pending further assessment)   Total Session Time   Timed Code Treatment Minutes 15   Total Session Time (sum of timed and untimed services) 25

## 2024-02-23 NOTE — PROGRESS NOTES
"[facility] Progress Note       SUBJECTIVE:  Patient is confused, does not respond appropriately to my questions.        OBJECTIVE:  /53 (BP Location: Left arm)   Pulse 73   Temp 97.6  F (36.4  C) (Axillary)   Resp 20   Ht 1.422 m (4' 8\")   Wt 44.5 kg (98 lb)   SpO2 100%   BMI 21.97 kg/m    Temp (24hrs), Av.4  F (36.3  C), Min:97.2  F (36.2  C), Max:97.6  F (36.4  C)    Patient Vitals for the past 72 hrs:   Weight   24 1004 44.5 kg (98 lb)   24 0137 44.5 kg (98 lb)       Intake/Output Summary (Last 24 hours) at 2024 1135  Last data filed at 2024 1505  Gross per 24 hour   Intake 273 ml   Output --   Net 273 ml        PHYSICAL EXAM  GEN: NAD, female appears stated age sitting up in chair  HRT: no LE edema  RESP: unlabored  ABD: soft, does not respond negatively to palpation   SKIN: No rash or jaundice  NEURO: oriented only to self    Additional Data:  I have reviewed the patient's new clinical lab results:     Recent Labs   Lab Test 24  0540 24  0546 24  2226   WBC 7.3 6.4 6.9   HGB 9.9* 10.4* 11.6*   MCV 92 92 92    236 264     Recent Labs   Lab Test 24  0540 24  0546 24  2226   POTASSIUM 3.4 3.7 4.0   CHLORIDE 105 105 104   CO2  25   BUN 18.0 23.6* 31.2*   ANIONGAP 13 10 12     Recent Labs   Lab Test 24  0546 24  2226   ALBUMIN 3.3* 3.9   BILITOTAL 0.3 0.4   ALT <5 <5   AST          Imaging results:  CT chest/abdomen/pelvis 24:  IMPRESSION:  1.  Diffuse lung findings of peripheral reticulation, groundglass opacity, and scattered areas of bronchiectasis, findings suggestive of nonspecific interstitial pneumonia. Acute superimposed infectious pneumonia such as due to aspiration would be difficult to exclude, this study may serve as a baseline for future followup. Consider referral to Pulmonology for management.  2.  Intraluminal fluid present within the thoracic esophagus, which raises the risk for " aspiration.   3.  Cardiomegaly with marked biatrial enlargement. Mitral annular calcifications. Moderate atherosclerotic calcifications of the coronary arteries. Diffuse atherosclerotic calcifications of the thoracic and abdominal aorta and their branches without aneurysm or dissection.  4.  2.4 cm right thyroid nodule for which thyroid ultrasound is recommended for further characterization, which may be performed on a nonemergent outpatient basis.  5.  Colonic diverticulosis without diverticulitis. Moderate colonic stool. No other acute process within the abdomen or pelvis.    CXR 2/21/24:  IMPRESSION: Diffuse patchy infiltrates of both lungs left greater than right, which could be seen with multifocal aspiration pneumonia in the right clinical context. Upper limits normal heart size without discrete evidence of pulmonary vascular congestion. Atherosclerotic calcifications of the aortic arch and descending thoracic aorta. No significant pleural effusion. No pneumothorax. Osteopenia. Multilevel thoracic wedge compression deformities appearing grossly chronic, with exaggerated   thoracic kyphosis. Multiple old healed rib fractures on the right. No definite acute osseous abnormality.    Procedure results:  EGD 2/22/24 (Guillermo):  Findings:       A non-bleeding Zenker's diverticulum with a large opening, no impacted        food and no stigmata of recent bleeding was found.        A diverticulum with a large opening was found in the middle third of the        esophagus.        A hypertonic lower esophageal sphincter was found. There was moderate        resistance to endoscope advancement into the stomach. The Z-line was        regular. The gastroesophageal junction and cardia were normal on        retroflexed view. Area was successfully injected with 100 units        botulinum toxin.        Diffuse nodular mucosa was found in the stomach. Biopsies were taken        with a cold forceps for histology. Estimated blood loss:  none.        The examined duodenum was normal.   Impression:            - Zenker's diverticulum.                          - Diverticulum in the middle third of the esophagus.                          - Achalasia. Injected with botulinum toxin.                          - Nodular mucosa in the stomach. Biopsied.                          - Normal examined duodenum.   Recommendation:        - Return patient to hospital bui for ongoing care.                          - Advance diet as tolerated.                          - Continue present medications.                          - Await pathology results. (pending)                         - The findings and recommendations were discussed with                          the patient's family.      IMPRESSION:  Achalasia with dysphagia  Aspiration pneumonia  89 y/o female with PMH achalasia s/p Botox, GERD, Zenker's and esophageal diverticulum, HTN, CKD 3 admitted 2/20 for dysphagia and found to have recurrent aspiration pneumonia. EGD 2/22 showed known Zenkers/esophageal diverticulum, nodular gastric mucosa (pathology pending) and hypertonic LES c/w known achalasia for which botox was injected. She is not felt to be a good surgical candidate for surgical intervention/POEM for achalasia or the known mitral regurgitation. She has had improvement in dysphagia with botox injections in the past but at this time is not cleared for more than clear liquids by SLP. Dr. Li spoke with pt's family after EGD and impression is that family is not interested in G-tube/tube feeding for patient. Given limited options palliative care consult should be considered.    PLAN:  - Diet per SLP  - Await EGD pathology  - Consider palliative care consult  - Continue PPI    We will no longer actively follow this patient in-house. Please call if questions arise or patient's status changes.       (Dr. Li)  Flor Rogel PA-C  [facility]  2/23/2024 11:35 AM  [phone]  (office)    30 minutes of total time was spent providing patient care, including patient evaluation, reviewing documentation/test results, , and documentation.  ________________________________________________________________________

## 2024-02-23 NOTE — PROGRESS NOTES
02/23/24 2111   Appointment Info   Signing Clinician's Name / Credentials (SLP) Alex Goldman MA [facility] SLP   SLP Goals   Therapy Frequency (SLP Eval) 6 times/week   Swallowing Dysfunction &/or Oral Function for Feeding   Treatment of Swallowing Dysfunction &/or Oral Function for Feeding Minutes (02634) 14   Symptoms Noted During/After Treatment Fatigue   Treatment Detail/Skilled Intervention Patient upright in chair for dysphagia tx session. She was fatigued, but cooperative. Patient appeared to be hard of hearing, but also she was notably confused last night. Able to follow directions after multiple repetitions and visual models of instructions. Patient completed ice chip intake x2 with no overt s/s of aspiration observed. x1 single cup sip of water completed, patient with immediate burping after swallow, though no coughing or choking. She also demonstrated repeated swallowing, but suspect this was just dry swallows vs piecemeal bolus swallow. Patient declined any further PO trials despite discussion about intake for nutrition. Training provided to 1:1 sitter and nursing staff about diet recommendations of ice chips and sips of water for pleasure only following oral cares.   SLP Discharge Planning   SLP Plan F/u on FWP, ensure guidelines are met from nursing and patient/family perspective, potentially schedule VFSS for Monday 2/26 pending patient's progress over the weekend - will need to demonstrate ability/willingness to attempt more PO trials at bedside.   SLP Discharge Recommendation home with assist   SLP Rationale for DC Rec Acute on Chronic esophageal dysphagia   SLP Brief overview of current status    Recommend thin water and ice chips for pleasure only following thorough oral cares. Patient must be fully upright and alert for all intake. Patient with very limited PO intake attempts despite encouragement. May have need for temporary means of nutrition/hydration given limited intake. VFSS and esophagram  should be scheduled to assess swallow function potentially Monday 2/26, but patient will need to demonstrate increased participation/willingness to attempt further PO trials. Question overall progress given severity of dysfunction.     Total Session Time   Total Session Time (sum of timed and untimed services) 14

## 2024-02-23 NOTE — PROGRESS NOTES
"[facility] MEDICINE PROGRESS NOTE      Identification/Summary: Kim Dunn is a 89 year old female   PMHx: Advanced achalasia with prior Botox injections, GERD, esophagitis, esophageal diverticulum, recurrent aspiration, HTN, CKD 3    Hospital Course   2/20/24 - Presented to  ED with trouble swallowing, coughing/choking, and poor oral intake. She'd been feeling food \"get stuck\" in neck/chest. Initial workup showed mild troponin elevation and nonspecific ST-T changes on EKG. CT chest/abd/pelvis showed fluid in the thoracic esophagus.  Also seen was nonspecific interstitial pneumonia and reactive mediastinal lymphadenopathy. Thyroid nodule also noted.  2/21/24 - She was continually clearing throat with phlegm.  Cefepime was started for empiric aspiration pneumonia coverage.  GI was consulted.  Echocardiogram showed 70% LVEF with mild aortic insufficiency, moderate/severe mitral, moderate tricuspid insufficiency, normal RV size and function biatrial enlargement, and pulmonary hypertension.  2/22/24 - EGD showed Zenker's diverticulum, diverticulum in the middle third of the esophagus. Achalasia was injected with botulinum toxin. Nodular mucosa in the stomach was biopsied.  She was placed on 1:1 following procedure. NS was changed to LR for mild metabolic acidosis.  2/23/24 - Advanced her diet to thin liquids per speech.  She was clinically stable, but reported left hip pain.  XR of left hip obtained.  Dietitian was consulted for cachexia  Assessment & Plan   Left hip pain: Start with x-ray, add prn tylenol    Cachexia: Consult dietitian    Recurrent aspiration pneumonia: afebrile on cefepime.  Anticipate 7-day course antibiotics.    Esophageal dysphagia: Has severe achalasia requiring Botox injections historically.  Today, advanced to thin liquids and ice chips today.  Likely poor surgical candidate if she does not respond to Botox injections.  Defer Zenker's diverticulum and nodular " stomach mucosa management to gastroenterology.  Decrease LR rate    Nodular stomach mucosa: Biopsies pending    Essential hypertension: Amlodipine 5 mg daily, losartan 50 mg daily.  Pressure stable    Moderate/severe mitral regurgitation: Unlikely she would be a good surgical candidate.     Pulmonary hypertension: Estimated 60-65mmHg pressures on echo. Possibly class III given CT findings.    Generalized weakness: PT/OT consulted    CKD 3a: At baseline    Normocytic anemia: CBC tomorrow    Thyroid nodule: TSH reassuring, outpatient management    Coronary artery disease: Calcifications noted on CT chest abdomen pelvis    Pancreatic atrophy    Goals of care: Grandelías believes patient is DNR with prearrest intubation okay. Still needs clarification.     Discharge: Pending tolerance of diet and PT OT consults  DVT Prophylaxis:  Heparin SQ  Code Status: Full Code  Diet: NPO for Medical/Clinical Reasons Except for: Meds  Cardiac monitor: None  Body mass index is 21.97 kg/m .    Interval History  Reporting left hip pain when she lies on her side too long.  Denies other pain.  Denies new aspiration event.  No acute complaints.  No behavioral disturbances ordered in my discussion with the one-to-one sitter.  No acute events reported by bedside RN.  Hemodynamically stable.  Stable on room air.  Nicole not present in the room today.    Unchanged hearing impairment  Wearing hearing aid  Normal affect, answers appropriately, no acute distress  Cachectic  Regular rate and rhythm with systolic murmur  Bilateral crackles unchanged  Abdomen soft, nontender  Moving all 4 extremities  Tenderness palpation anterior lateral left hip, no crepitus or deformation    Wt Readings from Last 5 Encounters:   02/22/24 44.5 kg (98 lb)   01/04/23 40.8 kg (90 lb)   04/03/15 49.9 kg (110 lb)     Hollis Lees MD, MPH  Hospitalist,[facility]: [facility]  Phone: #[phone]    Medications:   Personally  Reviewed.  Medications    lactated ringers 75 mL/hr at 02/23/24 0411      amLODIPine  5 mg Oral At Bedtime    ceFEPIme  2 g Intravenous Q24H    docusate sodium  100 mg Oral Daily    gabapentin  100 mg Oral BID    heparin ANTICOAGULANT  5,000 Units Subcutaneous Q8H    losartan  50 mg Oral Daily    pantoprazole  40 mg Intravenous BID     Clinically Significant Risk Factors Present on Admission              # Hypoalbuminemia: Lowest albumin = 3.3 g/dL at 2/21/2024  5:46 AM, will monitor as appropriate     # Hypertension: Noted on problem list

## 2024-02-24 ENCOUNTER — APPOINTMENT (OUTPATIENT)
Dept: PHYSICAL THERAPY | Facility: CLINIC | Age: 89
DRG: 177 | End: 2024-02-24
Payer: MEDICARE

## 2024-02-24 ENCOUNTER — APPOINTMENT (OUTPATIENT)
Dept: RADIOLOGY | Facility: CLINIC | Age: 89
DRG: 177 | End: 2024-02-24
Attending: HOSPITALIST
Payer: MEDICARE

## 2024-02-24 LAB
ANION GAP SERPL CALCULATED.3IONS-SCNC: 16 MMOL/L (ref 7–15)
BUN SERPL-MCNC: 25 MG/DL (ref 8–23)
CALCIUM SERPL-MCNC: 8.3 MG/DL (ref 8.2–9.6)
CHLORIDE SERPL-SCNC: 105 MMOL/L (ref 98–107)
CREAT SERPL-MCNC: 1.06 MG/DL (ref 0.51–0.95)
DEPRECATED HCO3 PLAS-SCNC: 19 MMOL/L (ref 22–29)
EGFRCR SERPLBLD CKD-EPI 2021: 48 ML/MIN/1.73M2
ERYTHROCYTE [DISTWIDTH] IN BLOOD BY AUTOMATED COUNT: 13.7 % (ref 10–15)
GLUCOSE BLDC GLUCOMTR-MCNC: 62 MG/DL (ref 70–99)
GLUCOSE SERPL-MCNC: 68 MG/DL (ref 70–99)
HCT VFR BLD AUTO: 28.5 % (ref 35–47)
HGB BLD-MCNC: 9.1 G/DL (ref 11.7–15.7)
MAGNESIUM SERPL-MCNC: 1.7 MG/DL (ref 1.7–2.3)
MCH RBC QN AUTO: 29.3 PG (ref 26.5–33)
MCHC RBC AUTO-ENTMCNC: 31.9 G/DL (ref 31.5–36.5)
MCV RBC AUTO: 92 FL (ref 78–100)
PLATELET # BLD AUTO: 208 10E3/UL (ref 150–450)
POTASSIUM SERPL-SCNC: 3.2 MMOL/L (ref 3.4–5.3)
POTASSIUM SERPL-SCNC: 3.6 MMOL/L (ref 3.4–5.3)
RBC # BLD AUTO: 3.11 10E6/UL (ref 3.8–5.2)
SODIUM SERPL-SCNC: 140 MMOL/L (ref 135–145)
WBC # BLD AUTO: 6.1 10E3/UL (ref 4–11)

## 2024-02-24 PROCEDURE — 85027 COMPLETE CBC AUTOMATED: CPT | Performed by: HOSPITALIST

## 2024-02-24 PROCEDURE — 250N000011 HC RX IP 250 OP 636: Performed by: INTERNAL MEDICINE

## 2024-02-24 PROCEDURE — 250N000011 HC RX IP 250 OP 636: Performed by: HOSPITALIST

## 2024-02-24 PROCEDURE — 250N000013 HC RX MED GY IP 250 OP 250 PS 637: Performed by: HOSPITALIST

## 2024-02-24 PROCEDURE — 99232 SBSQ HOSP IP/OBS MODERATE 35: CPT | Performed by: HOSPITALIST

## 2024-02-24 PROCEDURE — 120N000001 HC R&B MED SURG/OB

## 2024-02-24 PROCEDURE — 97116 GAIT TRAINING THERAPY: CPT | Mod: GP

## 2024-02-24 PROCEDURE — 250N000011 HC RX IP 250 OP 636: Mod: JZ | Performed by: INTERNAL MEDICINE

## 2024-02-24 PROCEDURE — 36415 COLL VENOUS BLD VENIPUNCTURE: CPT | Performed by: HOSPITALIST

## 2024-02-24 PROCEDURE — 97530 THERAPEUTIC ACTIVITIES: CPT | Mod: GP

## 2024-02-24 PROCEDURE — 250N000013 HC RX MED GY IP 250 OP 250 PS 637: Performed by: INTERNAL MEDICINE

## 2024-02-24 PROCEDURE — 83735 ASSAY OF MAGNESIUM: CPT | Performed by: HOSPITALIST

## 2024-02-24 PROCEDURE — 84132 ASSAY OF SERUM POTASSIUM: CPT | Performed by: HOSPITALIST

## 2024-02-24 PROCEDURE — 80048 BASIC METABOLIC PNL TOTAL CA: CPT | Performed by: HOSPITALIST

## 2024-02-24 PROCEDURE — C9113 INJ PANTOPRAZOLE SODIUM, VIA: HCPCS | Performed by: INTERNAL MEDICINE

## 2024-02-24 PROCEDURE — 71045 X-RAY EXAM CHEST 1 VIEW: CPT

## 2024-02-24 RX ORDER — DEXTROSE, SODIUM CHLORIDE, SODIUM LACTATE, POTASSIUM CHLORIDE, AND CALCIUM CHLORIDE 5; .6; .31; .03; .02 G/100ML; G/100ML; G/100ML; G/100ML; G/100ML
INJECTION, SOLUTION INTRAVENOUS CONTINUOUS
Status: DISCONTINUED | OUTPATIENT
Start: 2024-02-24 | End: 2024-02-25

## 2024-02-24 RX ORDER — LIDOCAINE 4 G/G
1 PATCH TOPICAL
Status: DISCONTINUED | OUTPATIENT
Start: 2024-02-24 | End: 2024-02-27 | Stop reason: HOSPADM

## 2024-02-24 RX ORDER — POTASSIUM CHLORIDE 1500 MG/1
20 TABLET, EXTENDED RELEASE ORAL ONCE
Status: COMPLETED | OUTPATIENT
Start: 2024-02-24 | End: 2024-02-24

## 2024-02-24 RX ADMIN — LIDOCAINE 1 PATCH: 4 PATCH TOPICAL at 11:26

## 2024-02-24 RX ADMIN — PANTOPRAZOLE SODIUM 40 MG: 40 INJECTION, POWDER, FOR SOLUTION INTRAVENOUS at 09:41

## 2024-02-24 RX ADMIN — DOCUSATE SODIUM 100 MG: 100 CAPSULE, LIQUID FILLED ORAL at 09:41

## 2024-02-24 RX ADMIN — LOSARTAN POTASSIUM 50 MG: 50 TABLET, FILM COATED ORAL at 09:41

## 2024-02-24 RX ADMIN — PANTOPRAZOLE SODIUM 40 MG: 40 INJECTION, POWDER, FOR SOLUTION INTRAVENOUS at 21:25

## 2024-02-24 RX ADMIN — CEFEPIME 2 G: 2 INJECTION, POWDER, FOR SOLUTION INTRAVENOUS at 04:03

## 2024-02-24 RX ADMIN — Medication 1 TABLET: at 09:41

## 2024-02-24 RX ADMIN — GABAPENTIN 100 MG: 100 CAPSULE ORAL at 09:41

## 2024-02-24 RX ADMIN — SODIUM CHLORIDE, SODIUM LACTATE, POTASSIUM CHLORIDE, CALCIUM CHLORIDE AND DEXTROSE MONOHYDRATE: 5; 600; 310; 30; 20 INJECTION, SOLUTION INTRAVENOUS at 14:14

## 2024-02-24 RX ADMIN — ACETAMINOPHEN 650 MG: 325 TABLET ORAL at 04:03

## 2024-02-24 RX ADMIN — ACETAMINOPHEN 650 MG: 325 TABLET ORAL at 21:25

## 2024-02-24 RX ADMIN — AMLODIPINE BESYLATE 5 MG: 5 TABLET ORAL at 21:25

## 2024-02-24 RX ADMIN — POTASSIUM CHLORIDE 20 MEQ: 1500 TABLET, EXTENDED RELEASE ORAL at 14:25

## 2024-02-24 RX ADMIN — GABAPENTIN 100 MG: 100 CAPSULE ORAL at 21:25

## 2024-02-24 RX ADMIN — HEPARIN SODIUM 5000 UNITS: 5000 INJECTION, SOLUTION INTRAVENOUS; SUBCUTANEOUS at 04:03

## 2024-02-24 RX ADMIN — HEPARIN SODIUM 5000 UNITS: 5000 INJECTION, SOLUTION INTRAVENOUS; SUBCUTANEOUS at 13:36

## 2024-02-24 RX ADMIN — HEPARIN SODIUM 5000 UNITS: 5000 INJECTION, SOLUTION INTRAVENOUS; SUBCUTANEOUS at 21:25

## 2024-02-24 ASSESSMENT — ACTIVITIES OF DAILY LIVING (ADL)
ADLS_ACUITY_SCORE: 30
ADLS_ACUITY_SCORE: 29
ADLS_ACUITY_SCORE: 30
ADLS_ACUITY_SCORE: 29
ADLS_ACUITY_SCORE: 30
DEPENDENT_IADLS:: CLEANING;SHOPPING;MEDICATION MANAGEMENT;MONEY MANAGEMENT;TRANSPORTATION
ADLS_ACUITY_SCORE: 29
ADLS_ACUITY_SCORE: 30
ADLS_ACUITY_SCORE: 29
ADLS_ACUITY_SCORE: 29
ADLS_ACUITY_SCORE: 30
ADLS_ACUITY_SCORE: 29
ADLS_ACUITY_SCORE: 30
ADLS_ACUITY_SCORE: 30
ADLS_ACUITY_SCORE: 29
ADLS_ACUITY_SCORE: 30
ADLS_ACUITY_SCORE: 29
ADLS_ACUITY_SCORE: 30
ADLS_ACUITY_SCORE: 29
ADLS_ACUITY_SCORE: 30
ADLS_ACUITY_SCORE: 30
ADLS_ACUITY_SCORE: 29

## 2024-02-24 NOTE — PROGRESS NOTES
"[facility] MEDICINE PROGRESS NOTE      Identification/Summary: Kim Dunn is a 89 year old female   PMHx: Advanced achalasia with prior Botox injections, GERD, esophagitis, esophageal diverticulum, recurrent aspiration, HTN, CKD 3    Hospital Course   2/20/24 - Presented to  ED with trouble swallowing, coughing/choking, and poor oral intake. She'd been feeling food \"get stuck\" in neck/chest. Initial workup showed mild troponin elevation and nonspecific ST-T changes on EKG. CT chest/abd/pelvis showed fluid in the thoracic esophagus.  Also seen was nonspecific interstitial pneumonia and reactive mediastinal lymphadenopathy. Thyroid nodule also noted.  2/21/24 - She was continually clearing throat with phlegm.  Cefepime was started for empiric aspiration pneumonia coverage.  GI was consulted.  Echocardiogram showed 70% LVEF with mild aortic insufficiency, moderate/severe mitral, moderate tricuspid insufficiency, normal RV size and function biatrial enlargement, and pulmonary hypertension.  2/22/24 - EGD showed Zenker's diverticulum, diverticulum in the middle third of the esophagus. Achalasia was injected with botulinum toxin. Nodular mucosa in the stomach was biopsied.  She was placed on 1:1 following procedure. NS was changed to LR for mild metabolic acidosis.  2/23/24 - Advanced her diet to thin liquids per speech.  She was clinically stable, but reported left hip pain and was less interactive.  XR of left hip unremarkable.  Dietitian was consulted for cachexia.  2/24/24 - More alert. Having discomfort with swallowing. Code status updated DNR/DNI. Started on D5 trickle for nutrition.  EGD biopsy report negative for gastritis, dysplasia, or H. pylori.  Lidocaine patches for left hip. Palliative consulted.   Assessment & Plan   Lung crackles: Stopped LR, check chest x-ray.  No hypoxia on room air, no respiratory distress    Left hip pain: Lidocaine patch    Cachexia: Start " D5    Hypokalemia: Check magnesium, replacement protocol    Recurrent aspiration pneumonia: afebrile on cefepime. Continue through 2/27.    Esophageal dysphagia: Hx severe achalasia requiring Botox injections. Ice chips PO advance per speech who will see tomorrow. Likely poor surgical candidate if she does not respond to Botox injections.  Defer Zenker's diverticulum and nodular stomach mucosa management to gastroenterology.     Essential hypertension: Amlodipine 5 mg daily, losartan 50 mg daily.  Pressure good.    Moderate/severe mitral regurgitation: Unlikely she would be a good surgical candidate.     Pulmonary hypertension: Estimated 60-65mmHg pressures on echo. Possibly class III given CT findings.    Generalized weakness: PT/OT consulted    CKD 3a: At baseline.  A.m. BMP    Normocytic anemia: Likely dilution with recent IVF.    Thyroid nodule: TSH reassuring, outpatient management    Coronary artery disease: Calcifications noted on CT chest abdomen pelvis    Pancreatic atrophy     Discharge: Pending diet advance/PO tolerance and therapy  DVT Prophylaxis:  Heparin SQ  Code Status: No CPR- Do NOT Intubate  Diet: NPO for Medical/Clinical Reasons Except for: Meds, Ice Chips, Other; Specify: thin liquids  Cardiac monitor: None  Body mass index is 21.97 kg/m .    Interval History  Unchanged hip pain.  Blood glucose 62.  Hemoglobin 9.1.  Potassium 3.2. Grandson here today. Agrees she is more at cognitive baseline.  We discussed ways of care with advanced directive now in hand, okay with palliative. No lightheadedness, chest pressure shortness of breath.  Sounds like she has good results with lidocaine patches for the hip.    Sitting in bed, more appropriate conversation today, alert  Grimacing with swallowing  Pupils symmetric  Cachectic, no overt distress  Left hip tenderness palpation with no crepitus  No peripheral edema    Last 24H PRN:     acetaminophen (TYLENOL) tablet 650 mg, 650 mg at 02/24/24 0403 **OR**  acetaminophen (TYLENOL) Suppository 650 mg    carboxymethylcellulose PF (REFRESH PLUS) 0.5 % ophthalmic solution 1 drop, 1 drop at 02/23/24 1839    Wt Readings from Last 5 Encounters:   02/22/24 44.5 kg (98 lb)   01/04/23 40.8 kg (90 lb)   04/03/15 49.9 kg (110 lb)     Hollis Lees MD, MPH  Hospitalist,[facility]: [facility]  Phone: #[phone]    Medications:   Personally Reviewed.  Medications    dextrose 5% in lactated ringers        amLODIPine  5 mg Oral At Bedtime    ceFEPIme  2 g Intravenous Q24H    docusate sodium  100 mg Oral Daily    gabapentin  100 mg Oral BID    heparin ANTICOAGULANT  5,000 Units Subcutaneous Q8H    lidocaine  1 patch Transdermal Q24H    losartan  50 mg Oral Daily    multivitamin w/minerals  1 tablet Oral Daily    pantoprazole  40 mg Intravenous BID     Clinically Significant Risk Factors        # Hypokalemia: Lowest K = 3.2 mmol/L in last 2 days, will replace as needed       # Hypoalbuminemia: Lowest albumin = 3.3 g/dL at 2/21/2024  5:46 AM, will monitor as appropriate     # Hypertension: Noted on problem list         # Severe Malnutrition: based on nutrition assessment, PRESENT ON ADMISSION

## 2024-02-24 NOTE — CONSULTS
Care Management Initial Consult    General Information  Assessment completed with: Patient, Family,    Type of CM/SW Visit: Initial Assessment    Primary Care Provider verified and updated as needed: Yes   Readmission within the last 30 days: no previous admission in last 30 days         Advance Care Planning: Advance Care Planning Reviewed: verified with patient          Communication Assessment  Patient's communication style: spoken language (English or Bilingual)    Hearing Difficulty or Deaf: yes   Wear Glasses or Blind: no    Cognitive  Cognitive/Neuro/Behavioral: .WDL except  Level of Consciousness: confused  Arousal Level: opens eyes spontaneously  Orientation: disoriented to, place, time, situation  Mood/Behavior: calm  Best Language: 0 - No aphasia  Speech: clear    Living Environment:   People in home: alone     [place] living Ar[place]     Able to return to prior arrangements: yes       Family/Social Support:  Care provided by: self, child(sky)  Provides care for: no one, unable/limited ability to care for self  Marital Status:    (Grandson)          Description of Support System: Supportive, Involved         Current Resources:   Patient receiving home care services: No     Community Resources: None  Equipment currently used at home: grab bar, toilet, grab bar, tub/shower (pt unsure of equipment at home)  Supplies currently used at home:      Employment/Financial:  Employment Status: retired        Financial Concerns: none   Referral to Financial Worker: No       Does the patient's insurance plan have a 3 day qualifying hospital stay waiver?  No    Lifestyle & Psychosocial Needs:  Social Determinants of Health     Food Insecurity: Not on file   Depression: Not on file   Housing Stability: Not on file   Tobacco Use: Unknown (3/9/2023)    Patient History     Smoking Tobacco Use: Never     Smokeless Tobacco Use: Unknown     Passive Exposure: Not on file   Financial Resource Strain: Not on file    Alcohol Use: Not on file   Transportation Needs: Not on file   Physical Activity: Not on file   Interpersonal Safety: Not on file   Stress: Not on file   Social Connections: Not on file       Functional Status:  Prior to admission patient needed assistance:   Dependent ADLs:: Independent  Dependent IADLs:: Cleaning, Shopping, Medication Management, Money Management, Transportation       Mental Health Status:  Mental Health Status: No Current Concerns       Chemical Dependency Status:  Chemical Dependency Status: No Current Concerns             Values/Beliefs:  Spiritual, Cultural Beliefs, Temple Practices, Values that affect care: no               Additional Information:  SWCM met and introduced self and CM services to Pt, Grandson and his wife (Both Nurses).  [place] and Grandson lives very near and daily checks on Pt.  Pt has supportive family and friends that also check on Pt.  [place] and [place] some [place]. Pt's sister lives in same building.  Pt does not drive. Family states Pt is having swallowing issues and would like Palliative consult to discuss options for goals of care.  Grandson states that Pt has 2 sisters who do not get along well but will be open and want to be involved with goals of care for Pt.   Grandson states that Pt has some memory loss but is able to participate in her health care.  Grandson gave copy of HPOA and sent to [place] for validation.  Pt/Family deciding TCU VS home with HC or Hospice.  Family to transport if able.     STEVO Curtis

## 2024-02-24 NOTE — PLAN OF CARE
Problem: Adult Inpatient Plan of Care  Goal: Optimal Comfort and Wellbeing  Outcome: Progressing     Problem: Fall Injury Risk  Goal: Absence of Fall and Fall-Related Injury  Outcome: Progressing  Intervention: Promote Injury-Free Environment  Recent Flowsheet Documentation  Taken 2/24/2024 0403 by Laurel Hitchcock RN  Safety Promotion/Fall Prevention:   activity supervised   clutter free environment maintained   room door open   room near nurse's station   safety round/check completed   supervised activity  Taken 2/23/2024 2101 by Laurel Hitchcock RN  Safety Promotion/Fall Prevention:   activity supervised   clutter free environment maintained   room door open   room near nurse's station   safety round/check completed   supervised activity   Goal Outcome Evaluation:    VSS. Pt alert and oriented. [place]. Pt takes medications crushed in apple sauce. 1:1 sitter. Pt slept through the night except waking up once complaining of left sided neck and hip pain. RN gave tylenol and pt went back to sleep.

## 2024-02-24 NOTE — PLAN OF CARE
"  Problem: Adult Inpatient Plan of Care  Goal: Plan of Care Review  Description: The Plan of Care Review/Shift note should be completed every shift.  The Outcome Evaluation is a brief statement about your assessment that the patient is improving, declining, or no change.  This information will be displayed automatically on your shift  note.  Outcome: Progressing     Problem: Adult Inpatient Plan of Care  Goal: Patient-Specific Goal (Individualized)  Description: You can add care plan individualizations to a care plan. Examples of Individualization might be:  \"Parent requests to be called daily at 9am for status\", \"I have a hard time hearing out of my right ear\", or \"Do not touch me to wake me up as it startles  me\".  Outcome: Progressing   Goal Outcome Evaluation:       VSS on RA. Pt up in chair some of shift. Mouth cares provided. NPO. D5 running. Code status changed to DNR. Palliative consult. K+ protocol, replaced. Discharge pending                 "

## 2024-02-25 ENCOUNTER — APPOINTMENT (OUTPATIENT)
Dept: SPEECH THERAPY | Facility: CLINIC | Age: 89
DRG: 177 | End: 2024-02-25
Payer: MEDICARE

## 2024-02-25 ENCOUNTER — APPOINTMENT (OUTPATIENT)
Dept: OCCUPATIONAL THERAPY | Facility: CLINIC | Age: 89
DRG: 177 | End: 2024-02-25
Payer: MEDICARE

## 2024-02-25 LAB
ANION GAP SERPL CALCULATED.3IONS-SCNC: 13 MMOL/L (ref 7–15)
BUN SERPL-MCNC: 28.5 MG/DL (ref 8–23)
CALCIUM SERPL-MCNC: 8.6 MG/DL (ref 8.2–9.6)
CHLORIDE SERPL-SCNC: 107 MMOL/L (ref 98–107)
CREAT SERPL-MCNC: 1.13 MG/DL (ref 0.51–0.95)
DEPRECATED HCO3 PLAS-SCNC: 18 MMOL/L (ref 22–29)
EGFRCR SERPLBLD CKD-EPI 2021: 44 ML/MIN/1.73M2
GLUCOSE BLDC GLUCOMTR-MCNC: 67 MG/DL (ref 70–99)
GLUCOSE SERPL-MCNC: 76 MG/DL (ref 70–99)
HOLD SPECIMEN: NORMAL
PHOSPHATE SERPL-MCNC: 2.1 MG/DL (ref 2.5–4.5)
POTASSIUM SERPL-SCNC: 3.7 MMOL/L (ref 3.4–5.3)
SODIUM SERPL-SCNC: 138 MMOL/L (ref 135–145)

## 2024-02-25 PROCEDURE — 250N000013 HC RX MED GY IP 250 OP 250 PS 637: Performed by: INTERNAL MEDICINE

## 2024-02-25 PROCEDURE — 84100 ASSAY OF PHOSPHORUS: CPT | Performed by: HOSPITALIST

## 2024-02-25 PROCEDURE — 250N000011 HC RX IP 250 OP 636: Performed by: INTERNAL MEDICINE

## 2024-02-25 PROCEDURE — 99233 SBSQ HOSP IP/OBS HIGH 50: CPT | Performed by: INTERNAL MEDICINE

## 2024-02-25 PROCEDURE — 36415 COLL VENOUS BLD VENIPUNCTURE: CPT | Performed by: HOSPITALIST

## 2024-02-25 PROCEDURE — 250N000013 HC RX MED GY IP 250 OP 250 PS 637: Performed by: HOSPITALIST

## 2024-02-25 PROCEDURE — 80048 BASIC METABOLIC PNL TOTAL CA: CPT | Performed by: HOSPITALIST

## 2024-02-25 PROCEDURE — 250N000011 HC RX IP 250 OP 636: Mod: JZ | Performed by: INTERNAL MEDICINE

## 2024-02-25 PROCEDURE — 120N000001 HC R&B MED SURG/OB

## 2024-02-25 PROCEDURE — 97129 THER IVNTJ 1ST 15 MIN: CPT | Mod: GO

## 2024-02-25 PROCEDURE — 92526 ORAL FUNCTION THERAPY: CPT | Mod: GN

## 2024-02-25 PROCEDURE — 97535 SELF CARE MNGMENT TRAINING: CPT | Mod: GO

## 2024-02-25 RX ORDER — HEPARIN SODIUM 5000 [USP'U]/.5ML
5000 INJECTION, SOLUTION INTRAVENOUS; SUBCUTANEOUS EVERY 12 HOURS
Status: DISCONTINUED | OUTPATIENT
Start: 2024-02-25 | End: 2024-02-26

## 2024-02-25 RX ORDER — METOCLOPRAMIDE HYDROCHLORIDE 5 MG/5ML
5 SOLUTION ORAL 2 TIMES DAILY
Status: DISCONTINUED | OUTPATIENT
Start: 2024-02-25 | End: 2024-02-27 | Stop reason: HOSPADM

## 2024-02-25 RX ORDER — POTASSIUM CHLORIDE 750 MG/1
10 TABLET, EXTENDED RELEASE ORAL ONCE
Status: COMPLETED | OUTPATIENT
Start: 2024-02-25 | End: 2024-02-25

## 2024-02-25 RX ORDER — LEVOFLOXACIN 250 MG/1
250 TABLET, FILM COATED ORAL EVERY OTHER DAY
Qty: 3 TABLET | Refills: 0 | Status: DISCONTINUED | OUTPATIENT
Start: 2024-02-25 | End: 2024-02-27 | Stop reason: HOSPADM

## 2024-02-25 RX ADMIN — GABAPENTIN 100 MG: 100 CAPSULE ORAL at 20:59

## 2024-02-25 RX ADMIN — HEPARIN SODIUM 5000 UNITS: 5000 INJECTION, SOLUTION INTRAVENOUS; SUBCUTANEOUS at 16:27

## 2024-02-25 RX ADMIN — GABAPENTIN 100 MG: 100 CAPSULE ORAL at 09:14

## 2024-02-25 RX ADMIN — LEVOFLOXACIN 250 MG: 250 TABLET, FILM COATED ORAL at 16:27

## 2024-02-25 RX ADMIN — CEFEPIME 2 G: 2 INJECTION, POWDER, FOR SOLUTION INTRAVENOUS at 04:14

## 2024-02-25 RX ADMIN — METOCLOPRAMIDE HYDROCHLORIDE 5 MG: 5 SOLUTION ORAL at 21:43

## 2024-02-25 RX ADMIN — LIDOCAINE 1 PATCH: 4 PATCH TOPICAL at 09:14

## 2024-02-25 RX ADMIN — Medication 1 TABLET: at 09:14

## 2024-02-25 RX ADMIN — POTASSIUM CHLORIDE 10 MEQ: 750 TABLET, EXTENDED RELEASE ORAL at 09:14

## 2024-02-25 RX ADMIN — HEPARIN SODIUM 5000 UNITS: 5000 INJECTION, SOLUTION INTRAVENOUS; SUBCUTANEOUS at 04:14

## 2024-02-25 RX ADMIN — METOCLOPRAMIDE HYDROCHLORIDE 5 MG: 5 SOLUTION ORAL at 11:30

## 2024-02-25 ASSESSMENT — ACTIVITIES OF DAILY LIVING (ADL)
ADLS_ACUITY_SCORE: 29
ADLS_ACUITY_SCORE: 31
ADLS_ACUITY_SCORE: 31
ADLS_ACUITY_SCORE: 29
ADLS_ACUITY_SCORE: 31
ADLS_ACUITY_SCORE: 29
ADLS_ACUITY_SCORE: 30
ADLS_ACUITY_SCORE: 29
ADLS_ACUITY_SCORE: 29
ADLS_ACUITY_SCORE: 31
ADLS_ACUITY_SCORE: 30
ADLS_ACUITY_SCORE: 31
ADLS_ACUITY_SCORE: 31
ADLS_ACUITY_SCORE: 30
ADLS_ACUITY_SCORE: 31

## 2024-02-25 NOTE — PROGRESS NOTES
Speech-Language Pathology: Dysphagia Therapy     02/25/24 1000   Appointment Info   Signing Clinician's Name / Credentials (SLP) Elena Sandoval MS, CCC-SLP   General Information   General Observations Alert & cooperative. Pt sitting upright in bedside chair and consuming ice chips. Pt's hearing aids in place. Pt's grandson present at bedside.   Swallowing Dysfunction &/or Oral Function for Feeding   Treatment of Swallowing Dysfunction &/or Oral Function for Feeding Minutes (94044) 17   Symptoms Noted During/After Treatment None   Treatment Detail/Skilled Intervention Session targeted dysphagia therapy. Patient sitting upright in bedside chair and asking for ice chips. Pt with clear vocal quality. Pt's grandson reported better tolerance with less coughing than the previous few days. Patient consumed x5 ice chips with consistent belching/retching after each bolus, but no overt s/s of aspiration. Patient consumed thin water via single small cup sip with x1 delayed cough across x7 sips and consistent retching. Pt consumed x3 small 1/2 tsp bolus of puree with less retching than with thin liquids. Pt endorsing globus sensation at suprasternal notch with thin & puree. Pt with repeated swallows with all textures. Pt with increase in participation and accepting boluses. Trained pt, pt's grandson, & RN on recommendations with single small sips of water (thin consistency) and ice chips for comfort when sitting fully upright and alert as well as importance of oral cares. Explained procedure and recommendation for VFSS. Pt & pt's grandson voiced understanding & agreement. Pt's grandon asked appropriate questions re: plan of care, swallowing fx, and recommendations. SLP will continue to follow. Recommend water (thin consistency) and ice chips for pleasure, only following thorough oral cares. Patient must be fully upright and alert for all intake. Recommend VFSS and esophagram to assess swallowing function. VFSS scheduled for  Monday 2/25/24. SLP will continue to follow.   SLP Discharge Planning   SLP Plan VFSS; F/u on FWP, ensure guidelines are met from nursing and patient/family perspective   SLP Discharge Recommendation home with assist   SLP Rationale for DC Rec Acute on Chronic esophageal dysphagia   SLP Brief overview of current status  Recommend water (thin consistency) and ice chips for pleasure, only following thorough oral cares. Patient must be fully upright and alert for all intake. Recommend VFSS and esophagram to assess swallowing  function. VFSS scheduled for Monday 2/25/24. SLP will continue to follow.   Total Session Time   Total Session Time (sum of timed and untimed services) 17

## 2024-02-25 NOTE — PROGRESS NOTES
Speech Language Therapy Discharge Summary    MD called SLP to discuss recommendation for VFSS, which family was interested in and asking questions regarding swallowing function, including whether thickened liquids might be more comfortable, during speech therapy session. MD reported that from her professional opinion the VFSS is not warrented as pt is pursuing hospice upon discharge. MD reports that after talking to family, they voiced agreement with MD recommendation. If pt or family wish to determine strategies and texture modifications that may be more comfortable for her diet with overt s/s of dysphagia are present, patient & family can inquire about an OP VFSS through PCP. SLP will complete orders at this time. Please reconsult if new concerns arise.     Reason for therapy discharge:    Pt is pursuing hospice at discharge and are in agreement with deferring diet recommendations to MD.    Progress towards therapy goal(s). See goals on Care Plan in [facility] electronic health record for goal details.  Goals partially met.  Barriers to achieving goals:   discontinuation of services.    Therapy recommendation(s):    No further therapy is recommended.  If pt or family wish to determine strategies and texture modifications that may be more comfortable for her diet with overt s/s of dysphagia are present, patient & family can inquire about an OP VFSS through PCP.

## 2024-02-25 NOTE — PLAN OF CARE
Goal Outcome Evaluation:    VSS. Pt alert and oriented. [employer]. Pt takes medications crushed in apple sauce. Pt slept through the night. Pt wanted a sleeping medication. RN gave pt PRN tylenol. Pt was able to sleep. Pt calls appropriately. Call light within reach, bed in lowest position, bed alarm on.    Problem: Adult Inpatient Plan of Care  Goal: Optimal Comfort and Wellbeing  Outcome: Progressing     Problem: Delirium  Goal: Optimal Coping  Outcome: Progressing     Problem: Delirium  Goal: Improved Behavioral Control  Outcome: Progressing  Intervention: Minimize Safety Risk  Recent Flowsheet Documentation  Taken 2/25/2024 0007 by Laurel Hitchcock RN  Enhanced Safety Measures: room near unit station  Taken 2/24/2024 2125 by Laurel Hitchcock RN  Enhanced Safety Measures: room near unit station

## 2024-02-25 NOTE — PROGRESS NOTES
kamilah Marquez called. Wanted a POC blood sugar done. Results were 67. kamilah was content with results and will check on her in the AM.

## 2024-02-25 NOTE — PROGRESS NOTES
[facility]    Medicine Progress Note - Hospitalist Service    Date of Admission:  2/20/2024    Assessment & Plan   89-year-old woman with past medical history of advanced achalasia with acid reflux, esophageal diverticulum status post multiple Botox injections last 1 being in January 2024, hypertension, DNR/DNI status, CKD stage III  Patient was admitted with complaints of oral intake being compromised and concerns of aspiration pneumonia.  She underwent EGD on the 22nd of this month which revealed Zenker's diverticulum in the middle of the esophagus and had another Botox injection for the same  Conversations about palliative care versus hospice have been ongoing.  1/.  Recurrent dysphagia longstanding problem nothing new about it.  Patient's family seems to be aware of it and acknowledges that however they do think that the patient should be permitted to eat.  After discussing all of these matters we decided that the patient will pursue full liquid diet and not have another video swallow as that would not change the intent of food consumption for the patient.  2/.  Hypertension: Patient was noted to be initially hypertensive but over the last several days her blood pressure has been on the lower side continue to hold her medications and possibly discontinue them.  3/.  Esophageal stricture and Zenker's diverticulum: No evidence of malignancy on pathology.  4/.  CODE STATUS DNR/DNI  5/.  Aspiration pneumonia: Discontinue IV antibiotics switch her over to levofloxacin for additional 3 doses.  Patient's family members and I had an extended conversation in presence of the bedside nursing and we came to the conclusion that the patient should be permitted to eat, video swallow is not likely to change any outcomes and patient does not have any other intervention able diseases.  Palliative care to consult on patient likely discharge home with the same.            Diet: NPO for Medical/Clinical  Reasons Except for: Meds, Ice Chips, Other; Specify: thin liquids    DVT Prophylaxis: family aiming for hospice  Glass Catheter: Not present  Lines: None     Cardiac Monitoring: None  Code Status: No CPR- Do NOT Intubate      Clinically Significant Risk Factors        # Hypokalemia: Lowest K = 3.2 mmol/L in last 2 days, will replace as needed       # Hypoalbuminemia: Lowest albumin = 3.3 g/dL at 2/21/2024  5:46 AM, will monitor as appropriate     # Hypertension: Noted on problem list         # Severe Malnutrition: based on nutrition assessment, PRESENT ON ADMISSION   # Financial/Environmental Concerns: none         Disposition Plan      Expected Discharge Date: 02/26/2024      Destination: home;home with help/services  Discharge Comments: inadequate po intake            Rubi Grace MD  Hospitalist Service  [facility]  Securely message with [employer] (more info)  Text page via [employer] Paging/Directory   ______________________________________________________________________    Interval History     No acute issues overnight, seen by speech therapy today who maintained patient to be n.p.o. but patient wants to eat.  Had a long discussion with patient's grandchildren both of who are nurses.  The only reason they agreed to speech therapy/reduced so that the grandmother could eat.  They explained in no uncertain terms that patient is going to go home and eat everything that she wishes to and is not going to follow any dietary restrictions as she has not for the last 15 years.  Additionally that would not modify any of the care plan.  After much discussion it was decided that we will not pursue video swallow patient be placed on a full liquid diet with plan for palliative care tomorrow  Additionally noted to have multiple episodes of hypotension prompting holding of medications    Physical Exam   Vital Signs: Temp: 97.4  F (36.3  C) Temp src: Oral BP: 107/70 Pulse: 62   Resp: 18 SpO2: 95 % O2 Device:  None (Room air)    Weight: 84 lbs 12.8 oz    General Appearance: Alert awake oriented x 2 does not know the date petite built appears younger than stated age  Respiratory: Clear to auscultation  Cardiovascular: S1-S2  GI: Soft nontender bowel sounds are present  Skin: No rash or lesions  Other: No neurological deficits  Lower extremity edema    Medical Decision Making       65 MINUTES SPENT BY ME on the date of service doing chart review, history, exam, documentation & further activities per the note.  NOTE(S)/MEDICAL RECORDS REVIEWED over the past 24 hours: Discussed care plan with multiple family members and care team       Data    [Held by provider] amLODIPine  5 mg Oral At Bedtime    docusate sodium  100 mg Oral Daily    gabapentin  100 mg Oral BID    heparin ANTICOAGULANT  5,000 Units Subcutaneous Q12H    levofloxacin  250 mg Oral Every Other Day    lidocaine  1 patch Transdermal Q24H    [Held by provider] losartan  50 mg Oral Daily    metoclopramide  5 mg Oral BID    multivitamin w/minerals  1 tablet Oral Daily         I have personally reviewed the following data over the past 24 hrs:    N/A  \   N/A   / N/A     138 107 28.5 (H) /  76   3.7 18 (L) 1.13 (H) \       Imaging results reviewed over the past 24 hrs:   Recent Results (from the past 24 hour(s))   XR Chest Port 1 View    Narrative    EXAM: XR CHEST PORT 1 VIEW  LOCATION: [facility]  DATE: 2/24/2024    INDICATION: bilateral crackles, increased wob  COMPARISON: 02/21/2024.      Impression    IMPRESSION: No pleural fluid or pneumothorax. Reticular interstitial opacities throughout the lungs have not significantly changed. The cardiomediastinal silhouette is at the upper limits of normal in size. There is aortic calcification.

## 2024-02-26 ENCOUNTER — APPOINTMENT (OUTPATIENT)
Dept: SPEECH THERAPY | Facility: CLINIC | Age: 89
DRG: 177 | End: 2024-02-26
Attending: STUDENT IN AN ORGANIZED HEALTH CARE EDUCATION/TRAINING PROGRAM
Payer: MEDICARE

## 2024-02-26 ENCOUNTER — DOCUMENTATION ONLY (OUTPATIENT)
Dept: OTHER | Facility: CLINIC | Age: 89
End: 2024-02-26
Payer: MEDICARE

## 2024-02-26 ENCOUNTER — APPOINTMENT (OUTPATIENT)
Dept: OCCUPATIONAL THERAPY | Facility: CLINIC | Age: 89
DRG: 177 | End: 2024-02-26
Payer: MEDICARE

## 2024-02-26 ENCOUNTER — APPOINTMENT (OUTPATIENT)
Dept: PHYSICAL THERAPY | Facility: CLINIC | Age: 89
DRG: 177 | End: 2024-02-26
Payer: MEDICARE

## 2024-02-26 LAB
BACTERIA BLD CULT: NO GROWTH
BACTERIA BLD CULT: NO GROWTH

## 2024-02-26 PROCEDURE — 99233 SBSQ HOSP IP/OBS HIGH 50: CPT | Performed by: STUDENT IN AN ORGANIZED HEALTH CARE EDUCATION/TRAINING PROGRAM

## 2024-02-26 PROCEDURE — 97535 SELF CARE MNGMENT TRAINING: CPT | Mod: GO

## 2024-02-26 PROCEDURE — 99418 PROLNG IP/OBS E/M EA 15 MIN: CPT | Performed by: NURSE PRACTITIONER

## 2024-02-26 PROCEDURE — 250N000013 HC RX MED GY IP 250 OP 250 PS 637: Performed by: INTERNAL MEDICINE

## 2024-02-26 PROCEDURE — 120N000001 HC R&B MED SURG/OB

## 2024-02-26 PROCEDURE — 99223 1ST HOSP IP/OBS HIGH 75: CPT | Performed by: NURSE PRACTITIONER

## 2024-02-26 PROCEDURE — 97116 GAIT TRAINING THERAPY: CPT | Mod: GP

## 2024-02-26 PROCEDURE — 250N000011 HC RX IP 250 OP 636: Performed by: INTERNAL MEDICINE

## 2024-02-26 PROCEDURE — 250N000013 HC RX MED GY IP 250 OP 250 PS 637: Performed by: STUDENT IN AN ORGANIZED HEALTH CARE EDUCATION/TRAINING PROGRAM

## 2024-02-26 PROCEDURE — 250N000013 HC RX MED GY IP 250 OP 250 PS 637: Performed by: HOSPITALIST

## 2024-02-26 PROCEDURE — 92526 ORAL FUNCTION THERAPY: CPT | Mod: GN

## 2024-02-26 RX ORDER — AMOXICILLIN 250 MG
1 CAPSULE ORAL 2 TIMES DAILY
Status: DISCONTINUED | OUTPATIENT
Start: 2024-02-26 | End: 2024-02-27 | Stop reason: HOSPADM

## 2024-02-26 RX ORDER — AMOXICILLIN 250 MG
2 CAPSULE ORAL 2 TIMES DAILY PRN
Status: DISCONTINUED | OUTPATIENT
Start: 2024-02-26 | End: 2024-02-27 | Stop reason: HOSPADM

## 2024-02-26 RX ORDER — BISACODYL 10 MG
10 SUPPOSITORY, RECTAL RECTAL DAILY PRN
Status: DISCONTINUED | OUTPATIENT
Start: 2024-02-26 | End: 2024-02-27 | Stop reason: HOSPADM

## 2024-02-26 RX ORDER — DIPHENHYDRAMINE HYDROCHLORIDE AND LIDOCAINE HYDROCHLORIDE AND ALUMINUM HYDROXIDE AND MAGNESIUM HYDRO
10 KIT EVERY 6 HOURS PRN
Status: DISCONTINUED | OUTPATIENT
Start: 2024-02-26 | End: 2024-02-27 | Stop reason: HOSPADM

## 2024-02-26 RX ORDER — AMOXICILLIN 250 MG
2 CAPSULE ORAL 2 TIMES DAILY
Status: DISCONTINUED | OUTPATIENT
Start: 2024-02-26 | End: 2024-02-27 | Stop reason: HOSPADM

## 2024-02-26 RX ORDER — AMOXICILLIN 250 MG
1 CAPSULE ORAL 2 TIMES DAILY PRN
Status: DISCONTINUED | OUTPATIENT
Start: 2024-02-26 | End: 2024-02-27 | Stop reason: HOSPADM

## 2024-02-26 RX ADMIN — DOCUSATE SODIUM 100 MG: 100 CAPSULE, LIQUID FILLED ORAL at 10:23

## 2024-02-26 RX ADMIN — GABAPENTIN 100 MG: 100 CAPSULE ORAL at 10:23

## 2024-02-26 RX ADMIN — SENNOSIDES AND DOCUSATE SODIUM 1 TABLET: 8.6; 5 TABLET ORAL at 20:19

## 2024-02-26 RX ADMIN — Medication 1 TABLET: at 10:22

## 2024-02-26 RX ADMIN — GABAPENTIN 100 MG: 100 CAPSULE ORAL at 20:19

## 2024-02-26 RX ADMIN — Medication 1 DROP: at 14:54

## 2024-02-26 RX ADMIN — METOCLOPRAMIDE HYDROCHLORIDE 5 MG: 5 SOLUTION ORAL at 20:20

## 2024-02-26 RX ADMIN — HEPARIN SODIUM 5000 UNITS: 5000 INJECTION, SOLUTION INTRAVENOUS; SUBCUTANEOUS at 06:15

## 2024-02-26 RX ADMIN — METOCLOPRAMIDE HYDROCHLORIDE 5 MG: 5 SOLUTION ORAL at 10:23

## 2024-02-26 RX ADMIN — Medication 1 DROP: at 16:22

## 2024-02-26 RX ADMIN — LIDOCAINE 1 PATCH: 4 PATCH TOPICAL at 10:23

## 2024-02-26 RX ADMIN — BENZOCAINE: 0.1 GEL TOPICAL at 16:36

## 2024-02-26 RX ADMIN — BISACODYL 10 MG: 10 SUPPOSITORY RECTAL at 14:54

## 2024-02-26 RX ADMIN — BENZOCAINE: 0.1 GEL TOPICAL at 20:30

## 2024-02-26 ASSESSMENT — ACTIVITIES OF DAILY LIVING (ADL)
ADLS_ACUITY_SCORE: 30
ADLS_ACUITY_SCORE: 35
ADLS_ACUITY_SCORE: 30
ADLS_ACUITY_SCORE: 35
ADLS_ACUITY_SCORE: 30

## 2024-02-26 NOTE — PLAN OF CARE
Problem: Swallowing Impairment  Goal: Optimal Eating and Swallowing Without Aspiration  Outcome: Not Progressing     Full liquid diet. Frequent complaints of dry mouth during the shift, freqent mouth swabs done and offering water. Patient had complaints of some mouth pain, declined wanting medication for it. Up with standby assist and walker to bathroom. Palliative consult today.

## 2024-02-26 NOTE — PLAN OF CARE
Problem: Adult Inpatient Plan of Care  Goal: Patient-Specific Goal (Individualized): Next Phase of care    Goal Outcome Evaluation: Palliative Care Conference scheduled at 11:15AM today for the next phase of care, patient has been seen by Dr. Nina at the bedside for MD rounding. Patient is up on the chair having breakfast, VSS afebrile on room air, pain and discomfort on bony areas left hip, back, leg and foot managed with Voltaren gel, and lidocaine patch applied on left hip. Per Dr. Nina, will defer to Palliative for any changes on the current treatment plan. No new orders given. Patient's daughter Narda at the bedside.-Charla JOSEPH RN

## 2024-02-26 NOTE — PROGRESS NOTES
Writer spoke with Gracia(Speech Therapy) on the vocera, and relayed the instruction of Dr. Nina re: Patient's family requested to speak with Speech. Per Gracia will be seeing Patient within 30minutes. Patient's family updated. OT is at the bedside at this time as well.-Charla JOSEPH RN

## 2024-02-26 NOTE — PLAN OF CARE
Chief Complaint   Patient presents with   • Forms Completion     Clearance form for [facility]        HPI:   Laura Thomas is a 32 year old female, who is here today to discuss medical clearance    She is a new patient to me, PCP is Joslyn Quevedo APNP      Per notes from January 2023:   \"She continues to do intensive outpatient counseling individual and group counseling through the [facility].  She has continued with her sobriety and has not had any alcohol since about 10/31/2022.  She states that she has felt the best that she has in years.  She feels fluoxetine is working for her anxiety and depression.  She journals regularly, meditates, tries to exercise and is eating a healthy diet.  She does note that she has poor impulse control and poor concentration.  Her counselor has even noticed that she seems distracted at times and has a difficult time just sitting still.  She was diagnosed with ADHD, inattentive type around 2016.  She had neuropsych testing through [facility] with Noman Choe and was prescribed Adderall by Dr. Katrina Severence at [facility].  She went off of Adderall in March 2021 because she felt like she did not eat it.  She is wondering about going back on something for ADHD.  She does not want to be on Adderall again and does not want any medication with the risk of dependency.  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  Work is going well when she can be up and moving around and interacting with people.  When she has to sit down and focus on care plan, she struggles with concentration and focus.  She procrastinates also and college finds it difficult to just sit and focus on written work/charting.  Her counselor feels she might benefit from going back on ADHD medication.  She also struggles with sleep at times.  She was occasionally taking Unisom but she does not feel that works well for her.  Despite her concerns about ADHD and sleep, she feels she is doing very  VSS. Pt [place]. Alarms on for safety, pt off 1:1 bed in lowest postition. Full liquid diet. Saline locked. Awaiting palliative consult for tomorrow. Narda family member wants to be part of palliative care meeting tomorrow but lives an hour away wants an hour call before to come phone number is [phone]     Problem: Swallowing Impairment  Goal: Optimal Eating and Swallowing Without Aspiration  Outcome: Progressing   Goal Outcome Evaluation:                         well.  She has great family support and has made some positive changes.  She is been healthy.  No headaches, dizziness, vision changes, chest pain, palpitations, cough, shortness a breath, abdominal pain, nausea, vomiting, diarrhea, peripheral edema or weakness.  No suicidal ideations, hallucinations, extended periods without sleep or panic attacks.\"      Had been attending weekly group sessions  Bad breakup a couple weeks ago.  She states she is safe, changed the locks.  Has a huge support system.     Alcohol has been an issue for the past 2-1/2 years, but she was hiding it.   She has been in recovery for alcohol addiction for the past 2 years.   She recently relapsed.  She is been drinking half to 1 L per week for the past month.    Feels things have been spiraling.  Has not showered in the past 6 days.  Has not gotten out of bed since Sunday/Monday.  She made it to work yesterday, but had to go home.    She denies any history of seizures related to withdrawal.  She finally decided that she needed to seek treatment again.  Has never done an intensive inpatient program.    Already working with [facility], and they will have a bed for her tomorrow.    Generally, she feels her withdrawal symptoms are starting to get better, but has been feeling nauseous and has not been able to eat.    She has been vomiting, but none since 530 this morning.  States her mom is on her way, so she will have somebody with her later today until she goes to treatment.  She simply needs a form completed for her treatment program, no labs are needed.    ROS:  Pertinent positives are noted. All other systems were negative.     HISTORIES:  Past medical, surgical, family, and social histories were reviewed today.     ALLERGIES:  ALLERGIES:   Allergen Reactions   • Compazine ANXIETY   • Prochlorperazine ANXIETY       MEDICATIONS:   Outpatient Medications Marked as Taking for the 6/29/23 encounter (Office Visit) with Faby Harris  K, APNP   Medication Sig Dispense Refill   • fluoxetine (PROzac) 40 MG capsule Take 1 capsule by mouth daily. (In addition to a 10 mg dose for daily dose of 50 mg). 90 capsule 0   • FLUoxetine (PROzac) 10 MG capsule Take 1 capsule po daily. (In addition to 40 mg capsule for daily dose of 50 mg). 90 capsule 0       PHYSICAL EXAM:  Constitutional:  Pleasant, nontoxic appearance. She is a bit shaky/nauseous at 1 point, so she laid down on the exam table.  She was provided with Zofran, water, juice and crackers and did feel better after.   Vitals:  Blood pressure 128/76, pulse 98, height 5' 5\" (1.651 m), weight 57.1 kg (125 lb 12.8 oz), last menstrual period 01/05/2023, SpO2 98 %. Body mass index is 20.93 kg/m².  Neck: Supple. No masses or thyromegaly.   Respiratory:  Lungs clear to auscultation. Normal aeration. No adventitious sounds. Normal respiratory effort.    Cardiovascular:  Regular rate and rhythm.  Normal S1 and S2 without murmurs, clicks, gallops.  No lower extremity edema.   Gastrointestinal:  Soft abdomen, nondistended, normal bowel sounds. No tenderness, hepatosplenomegaly, masses, guarding or rebound tenderness.   Psychiatric:  Alert and oriented x3, good eye contact, connected thoughts. Mood and behavior appear appropriate. Appears to have adequate judgment and insight.         ASSESSMENT AND PLAN:    1. Alcohol dependence with uncomplicated withdrawal (CMD)    2. Nausea    3. Moderate major depression (CMD)    4. Generalized anxiety disorder      · Her form was completed and faxed.  A copy was given to her before she left.  · She did seem to feel better when she was given a dose of Zofran and some crackers and juice.  I did send in a prescription of Zofran.  She also needs a refill of her fluoxetine.  Prescriptions have been sent in by her PCP in January, needs to be sent to an alternate pharmacy  · She is aware of signs and symptoms that should prompt immediate evaluation in the emergency  department.    Follow Up:  Return in about 5 weeks (around 8/3/2023) for follow-up after alcohol treatment program (Joslyn Quevedo NP patient).      Orders Placed This Encounter   • fluoxetine (PROzac) 40 MG capsule   • FLUoxetine (PROzac) 10 MG capsule   • ondansetron (ZOFRAN ODT) 4 MG disintegrating tablet

## 2024-02-26 NOTE — CONSULTS
Care Management Follow Up    Length of Stay (days): 4    Expected Discharge Date: 02/27/2024     Concerns to be Addressed: discharge planning     Patient plan of care discussed at interdisciplinary rounds: Yes    Anticipated Discharge Disposition: Home, Home Care, Hospice, Palliative Care     Anticipated Discharge Services: None  Anticipated Discharge DME: None    Patient/family educated on Medicare website which has current facility and service quality ratings: yes  Education Provided on the Discharge Plan: Yes  Patient/Family in Agreement with the Plan: yes    Referrals Placed by CM/SW: Hospice, Palliative Care, Homecare  Private pay costs discussed: Not applicable at this time     Additional Information:  DAGOBERTO met with Pt and daughter Narda.  Palliative Care met with Pt and family.  Recommendation for Hospice.  Narda had no Hospice preference.  Referral sent to [facility].  Narda will update Jimmy- grandson. DAGOBERTO talked with Kali with [facility] and will give family a call.      3:26 PM  [facility] came to hospital to meet with Pt and Dtr.  Kali called and will see Pt tomorrow morning.     STEVO Curtis

## 2024-02-26 NOTE — PROGRESS NOTES
"CLINICAL NUTRITION SERVICES - BRIEF NOTE     Nutrition Prescription    RECOMMENDATIONS FOR MDs/PROVIDERS TO ORDER:  Recommend replace phosphorus with hypophosphatemia.   Continue MVI/M with inadequate oral intakes.     Malnutrition Status:    Severe malnutrition  In Context of:  Acute illness or injury    Recommendations already ordered by Registered Dietitian (RD):  Will send Ensure enlive at dinner     Future/Additional Recommendations:  Will monitor po intake, wt, GOC.      EVALUATION OF THE PROGRESS TOWARD GOALS   Diet: Full Liquid  Intake: Limited documentation on po intake, pt ordered x2 meals 2/25.      NEW FINDINGS   Met with pt family at bedside this afternoon, report pt struggling with some thicker foods. Writer offered nutrition supplements, family interested in trying and reports pt drinks Ensure at home. Last BM noted x1 2/22. Phos 2.1(L), hypoglycemia yesterday 2/25.     Per nsg pt complaints of mouth pain. Plan for palliative to see patient today- plan for hospice evaluation.     Per MD note 2/25/24- \"Recurrent dysphagia longstanding problem nothing new about it. Patient's family seems to be aware of it and acknowledges that however they do think that the patient should be permitted to eat. After discussing all of these matters we decided that the patient will pursue full liquid diet and not have another video swallow as that would not change the intent of food consumption for the patient.\"     Food Allergies: Seafood     ANTHROPOMETRICS  Height: 142.2 cm (4' 8\")  Most Recent Weight: 38.3 kg (84 lb 8 oz)    Weight History: Wt loss of >6% x 2 months   02/22/24 44.5 kg (98 lb)   01/04/23 40.8 kg (90 lb)   04/03/15 49.9 kg (110 lb)   12/14/22          41.3 kg (91 lb)     Dosing Weight: 44.5 kg     ASSESSED NUTRITION NEEDS  Estimated Energy Needs: 7324-9994 kcals/day (25 - 30+ kcals/kg)  Justification: Maintenance  Estimated Protein Needs: 35-45 grams protein/day (0.8 - 1 grams of pro/kg)  Justification: " CKD and Repletion  Estimated Fluid Needs: 1112 mL/day (25 mL/kg)   Justification: Maintenance    MALNUTRITION:  Malnutrition Diagnosis: Severe malnutrition  In Context of:  Acute illness or injury    INTERVENTIONS  Implementation  Will send Ensure enlive at dinner     Monitoring/Evaluation  Progress toward goals will be monitored and evaluated per protocol.

## 2024-02-26 NOTE — PLAN OF CARE
"  Problem: Adult Inpatient Plan of Care  Goal: Plan of Care Review  Description: The Plan of Care Review/Shift note should be completed every shift.  The Outcome Evaluation is a brief statement about your assessment that the patient is improving, declining, or no change.  This information will be displayed automatically on your shift  note.  Outcome: Progressing     Problem: Adult Inpatient Plan of Care  Goal: Patient-Specific Goal (Individualized)  Description: You can add care plan individualizations to a care plan. Examples of Individualization might be:  \"Parent requests to be called daily at 9am for status\", \"I have a hard time hearing out of my right ear\", or \"Do not touch me to wake me up as it startles  me\".  Outcome: Progressing   Goal Outcome Evaluation:       VSS on RA. Pt up A1GBW to bathroom. Orders changed, see note. Palliative consult 2//26. Discharge pending                 "

## 2024-02-26 NOTE — CONSULTS
Palliative Care Consultation Note  [facility]      Patient: Kim Dunn  Date of Admission:  2/20/2024    Requesting Clinician / Team: Dr Lees  Reason for consult: Goals of care (Family is aware: dysphagia, cachexia)       Recommendations & Counseling     GOALS OF CARE:   Reviewed with pt and her family, the indepth discussion she had with MD yesterday re: her achalasia/dysphagia. And her wish to not have any more GI/swallowing evaluation, and be allowed diet for comfort. Pt and family informed of the risks of aspiration pneumonia and choking, death risk. With diet for comfort, this would allow pt to try to have some foods she might enjoy (so far today she has tolerated full liquid diet, per grandson)   Spent time talking about hospice. Placed a CM/SW consult for family to meet with a hospice agency. Indepth discussion re: what it entails and reviewed a potential/probable need for more assistance than just what hospice can provide.   Pt is swallowing her medications ok, and accepting of VS and other monitoring.   Recommended follow up visits with dietician and SLP re: safe food and any additional swallowing techniques that might be helpful.   Not initiating comfort care today.  It pt took a turn for the worse, family would like to be notified promptly and to focus on comfort.   Addendum: Per grandson, pt has a strong adverse reaction to opioids and benzos. Advised he relay this to Hospice staff as well.     ADVANCE CARE PLANNING:  Patient has an advance directive dated  .  Primary Health Care Agent Nicole Marquez.  Alternate(s) Dtr Narda.   There is no POLST form on file, recommend to complete prior to DC.  Code status: No CPR- Do NOT Intubate    MEDICAL MANAGEMENT:   Pt has a very big hx of constipation (x 30 years). Has 1 BM per week at home. Am not seeing last recorded BM here. Was trying manual extraction this AM. Recent abdominal CT suggest moderate stool burden.   -Discussed with MD.  Recommended an abdominal x-ray, then likely a suppository, plus stimulant laxatives.     [place], even w/hearing aids in. Does not like the pocket talker.   -Appreciate staff sitting close with her and going over things slowly, verify understanding.     PSYCHOSOCIAL/SPIRITUAL SUPPORT:  Family.   She is Yarsani might benefit from a spiritual care visit.    Palliative Care will continue to follow Kim. Thank you for the consult and allowing us to aid in the care of Kim Dunn.    Case discussed with MD, RN, CM.    Meena Gutierrez NP  Securely message with Vocera (more info)  Text page via [facility] Paging/Directory     TTS: I have personally spent a total of 80 minutes  today on the unit in review of medical record, consultation with the medical providers and assessment of patient, with more than 50% of this time spent in counseling, coordination of care, and discussion  in a family meeting re: diagnostic results, prognosis, symptom management, risks and benefits of management options, emotional support and development of plan of care. An additional 15 minutes was spent in an extended visit, reviewing chart, documenting. Total time: 95 minutes.     Assessment      Kim Dunn is a 89 year old female with a past medical history of advanced achalasia with GERD - and secondary esophagitis/esophageal diverticulum. S/P multiple botulinum toxin injections for management of lower esophageal junction and improvement of dysphagia (last Botox Jan 2024) who presented on Feb. 20th with continued dysphagia.      Today, the patient was seen for: Goals of care (Family is aware: Dysphagia, cachexia)    Hospitalist note from yesterday:  89-year-old woman with past medical history of advanced achalasia with acid reflux, esophageal diverticulum status post multiple Botox injections last 1 being in January 2024, hypertension, DNR/DNI status, CKD stage III  Patient was admitted with complaints of oral intake being compromised and concerns  of aspiration pneumonia.  She underwent EGD on the 22nd of this month which revealed Zenker's diverticulum in the middle of the esophagus and had another Botox injection for the same  Conversations about palliative care versus hospice have been ongoing.  1/.  Recurrent dysphagia longstanding problem nothing new about it.  Patient's family seems to be aware of it and acknowledges that however they do think that the patient should be permitted to eat.  After discussing all of these matters we decided that the patient will pursue full liquid diet and not have another video swallow as that would not change the intent of food consumption for the patient.  2/.  Hypertension: Patient was noted to be initially hypertensive but over the last several days her blood pressure has been on the lower side continue to hold her medications and possibly discontinue them.  3/.  Esophageal stricture and Zenker's diverticulum: No evidence of malignancy on pathology.  4/.  CODE STATUS DNR/DNI  5/.  Aspiration pneumonia: Discontinue IV antibiotics switch her over to levofloxacin for additional 3 doses.  Patient's family members and I had an extended conversation in presence of the bedside nursing and we came to the conclusion that the patient should be permitted to eat, video swallow is not likely to change any outcomes and patient does not have any other intervention able diseases.  Palliative care to consult on patient likely discharge home with the same.    Palliative Care Summary:   Met with pts 2 dtrs (Narda in person, david Castrejon on the phone) plus kamilah Marquez in person.     Prognosis, Goals, & Planning:    Functional Status just prior to this current hospitalization:  Limited, has had falls    Prognosis, Goals, and/or Advance Care Planning:  Indepth discussion re: dysphagia, long hx.     Code Status was addressed today:   No. Already DNR/DNI    Patient's decision making preferences: with input from medical clinicians and loved  ones        Patient has decision-making capacity today for complex decisions:Questionable            Coping, Meaning, & Spirituality:   Mood, coping, and/or meaning in the context of serious illness were addressed today: Yes. Pt is frustrated. Feels lonely at times.     Social:   Is . Has 2 dtrs and 2 grandchildren. Lives in a  residence. Younger sister resides there. Family think pt needs 24 hour care.     Medications:  I have reviewed this patient's medication profile and medications from this hospitalization. Notable medications: Reviewed.      ROS:  Comprehensive ROS is reviewed and is negative except as here & per HPI:     Physical Exam   Vital Signs with Ranges  Temp:  [97.3  F (36.3  C)-97.9  F (36.6  C)] 97.6  F (36.4  C)  Pulse:  [58-72] 63  Resp:  [16-20] 16  BP: (107-129)/(53-70) 108/54  SpO2:  [95 %-96 %] 96 %  84 lbs 8 oz    PHYSICAL EXAM:  Alert, extremely [place] (even with hearing aids in)  Lungs CTA  CV RRR  Abd Semi firm, hypoactive bowel sounds.     Data reviewed:  No results found for this or any previous visit (from the past 24 hour(s)).  EXAM: CT CHEST/ABDOMEN/PELVIS W CONTRAST  LOCATION: [facility]  DATE: 2/21/2024     INDICATION: aphagia, abnormal x ray  COMPARISON: None.  TECHNIQUE: CT scan of the chest, abdomen, and pelvis was performed following injection of IV contrast. Multiplanar reformats were obtained. Dose reduction techniques were used.   CONTRAST: [id number] 75ml     FINDINGS:   LUNGS AND PLEURA: Diffuse lung findings of peripheral reticulation, groundglass opacity, and scattered areas of bronchiectasis, findings suggestive of nonspecific interstitial pneumonia. Acute superimposed infectious pneumonia such as due to aspiration   would be difficult to exclude, this study may serve as a baseline for future followup. No pleural effusion or pneumothorax.     MEDIASTINUM/AXILLAE: Diffuse atherosclerotic calcifications of the aortic arch and descending  thoracic aorta. No aneurysmal dilatation. Cardiomegaly with marked biatrial enlargement. Mitral annular calcifications. Prominent mediastinal lymph nodes, which   may be reactive. Intraluminal fluid present within the thoracic esophagus, which raises the risk for aspiration. Right thyroid nodule measuring 2.4 cm (series 4 image 35).     CORONARY ARTERY CALCIFICATION: Moderate.     HEPATOBILIARY: Hepatic cysts which require no dedicated follow-up. Unremarkable gallbladder.     PANCREAS: Somewhat atrophic with mild prominence of the main pancreatic duct, otherwise unremarkable.     SPLEEN: Normal.     ADRENAL GLANDS: Normal.     KIDNEYS/BLADDER: Tiny renal cysts which require no dedicated follow-up. No hydronephrosis. Unremarkable urinary bladder.     BOWEL: Colonic diverticulosis. Moderate colonic stool. No bowel obstruction.     LYMPH NODES: Normal.     VASCULATURE: Diffuse atherosclerotic calcifications of the aorta and its branches without evidence of aneurysmal dilatation.     PELVIC ORGANS: Hysterectomy.     MUSCULOSKELETAL: Old healed fractures of the right superior and inferior pubic rami. Old healed sacral ala fractures. Multiple old healed rib fractures. Multilevel degenerative changes of the cervicothoracic and lumbosacral spine. Exaggerated thoracic   kyphosis. No definite acute osseous abnormality.                                                                      IMPRESSION:     1.  Diffuse lung findings of peripheral reticulation, groundglass opacity, and scattered areas of bronchiectasis, findings suggestive of nonspecific interstitial pneumonia. Acute superimposed infectious pneumonia such as due to aspiration would be   difficult to exclude, this study may serve as a baseline for future followup. Consider referral to Pulmonology for management.  2.  Intraluminal fluid present within the thoracic esophagus, which raises the risk for aspiration.   3.  Cardiomegaly with marked biatrial enlargement.  Mitral annular calcifications. Moderate atherosclerotic calcifications of the coronary arteries. Diffuse atherosclerotic calcifications of the thoracic and abdominal aorta and their branches without   aneurysm or dissection.  4.  2.4 cm right thyroid nodule for which thyroid ultrasound is recommended for further characterization, which may be performed on a nonemergent outpatient basis.  5.  Colonic diverticulosis without diverticulitis. Moderate colonic stool. No other acute process within the abdomen or pelvis.

## 2024-02-26 NOTE — PROGRESS NOTES
Writer touched base with Dr. Nina at the Nurse Station for the progress of care, at this time no new orders given, waiting for Palliative Consult to happen at 11:15AM and will go from there.-Charla JOSEPH RN

## 2024-02-26 NOTE — PROGRESS NOTES
[facility]    Medicine Progress Note - Hospitalist Service    Date of Admission:  2/20/2024    Assessment & Plan     Changes today:  -If patient decompensates call family and likely plan for comfort cares  -Palliative care conference today, appreciate their recommendations and expertise, family would like to pursue hospice evaluation and take patient home if able.  Hospice consult placed, SW/CM aware.  -Bowel regimen ordered  -Swish and spit/oragel ordered for mouth pain  -Reach out to SLP, family would like to discuss methods to avoid aspiration but would not like further evaluation.  -Discontinued antihypertensives after discussion with family  -Continue all other current management    89-year-old woman with past medical history of advanced achalasia with acid reflux, esophageal diverticulum status post multiple Botox injections last 1 being in January 2024, hypertension, DNR/DNI status, CKD stage III  Patient was admitted with complaints of oral intake being compromised and concerns of aspiration pneumonia.  She underwent EGD on the 22nd of this month which revealed Zenker's diverticulum in the middle of the esophagus and had another Botox injection for the same  Conversations about palliative care versus hospice have been ongoing.  1/.  Recurrent dysphagia longstanding problem nothing new about it.  Patient's family seems to be aware of it and acknowledges that however they do think that the patient should be permitted to eat.  After discussing all of these matters we decided that the patient will pursue full liquid diet and not have another video swallow as that would not change the intent of food consumption for the patient.  2/.  Hypertension: Patient was noted to be initially hypertensive but over the last several days her blood pressure has been on the lower side continue to hold her medications and possibly discontinue them.  3/.  Esophageal stricture and Zenker's diverticulum: No  evidence of malignancy on pathology.  4/.  CODE STATUS DNR/DNI  5/.  Aspiration pneumonia: Discontinue IV antibiotics switch her over to levofloxacin for additional 3 doses.  Patient's family members and I(Dr. Grace) had an extended conversation in presence of the bedside nursing and we came to the conclusion that the patient should be permitted to eat, video swallow is not likely to change any outcomes and patient does not have any other intervention able diseases.  Palliative care to consult on patient likely discharge home with the same.            Diet: Full Liquid Diet    DVT Prophylaxis: family aiming for hospice, stopped Heparin after discussion with family  Glass Catheter: Not present  Lines: None     Cardiac Monitoring: None  Code Status: No CPR- Do NOT Intubate      Clinically Significant Risk Factors              # Hypoalbuminemia: Lowest albumin = 3.3 g/dL at 2/21/2024  5:46 AM, will monitor as appropriate     # Hypertension: Noted on problem list         # Severe Malnutrition: based on nutrition assessment, PRESENT ON ADMISSION     # Financial/Environmental Concerns: none         Disposition Plan      Expected Discharge Date: 02/27/2024      Destination: home;home with help/services  Discharge Comments: inadequate po intake            Vitaly Nina MD  Hospitalist Service  [facility]  Securely message with [employer] (more info)  Text page via [employer] Paging/Directory   ______________________________________________________________________    Interval History   No acute events overnight.  The patient met with family several times today.  Family very supportive wanting to minimize any discomfort patient.  They are aware that she is likely going to continue to aspirate and they would like to discuss some strategies to minimize the risk knowing that they are unable to eliminate or ultimately fix her issues of dysphagia.  They are concerned that she has not had a bowel movement in  several days has a longstanding history of constipation they would like a suppository for obvious reasons declined MiraLAX which is reasonable offered an enema would like to try suppository first and go from there.  Discussed her antihypertensives and pill burden family was agreeable to stop her blood pressure medications especially given the fact that she is normotensive here.  Family is pursuing hospice cares at this time.    Physical Exam   Vital Signs: Temp: 97.6  F (36.4  C) Temp src: Oral BP: 108/54 Pulse: 63   Resp: 16 SpO2: 96 % O2 Device: None (Room air)    Weight: 84 lbs 8 oz    General Appearance: Alert awake oriented x 2 does not know the date petite built appears younger than stated age  Respiratory: Clear to auscultation  Cardiovascular: S1-S2  GI: Soft nontender bowel sounds are present  Skin: No rash or lesions  Other: No neurological deficits  Lower extremity edema    Medical Decision Making       60 MINUTES SPENT BY ME on the date of service doing chart review, history, exam, documentation & further activities per the note.  NOTE(S)/MEDICAL RECORDS REVIEWED over the past 24 hours: Discussed care plan with multiple family members and care team       Data    docusate sodium  100 mg Oral Daily    gabapentin  100 mg Oral BID    levofloxacin  250 mg Oral Every Other Day    lidocaine  1 patch Transdermal Q24H    metoclopramide  5 mg Oral BID    multivitamin w/minerals  1 tablet Oral Daily    senna-docusate  1 tablet Oral BID    Or    senna-docusate  2 tablet Oral BID             Imaging results reviewed over the past 24 hrs:   No results found for this or any previous visit (from the past 24 hour(s)).

## 2024-02-26 NOTE — PROGRESS NOTES
Writer spoke with Meena(Palliative) earlier today about time for palliative consult as patient's daughter Narda requested to be called back before the care conference. Noted Meena informed Writer of her awareness and will be calling Narda back to make sure she is here at least an hour before the care conference. Per Meena plans to have the meeting at 11nish today.-Charla JOSEPH RN

## 2024-02-27 ENCOUNTER — APPOINTMENT (OUTPATIENT)
Dept: PHYSICAL THERAPY | Facility: CLINIC | Age: 89
DRG: 177 | End: 2024-02-27
Payer: MEDICARE

## 2024-02-27 VITALS
OXYGEN SATURATION: 98 % | DIASTOLIC BLOOD PRESSURE: 60 MMHG | RESPIRATION RATE: 16 BRPM | WEIGHT: 85 LBS | SYSTOLIC BLOOD PRESSURE: 123 MMHG | TEMPERATURE: 98.1 F | HEIGHT: 56 IN | BODY MASS INDEX: 19.12 KG/M2 | HEART RATE: 61 BPM

## 2024-02-27 PROCEDURE — 250N000013 HC RX MED GY IP 250 OP 250 PS 637: Performed by: INTERNAL MEDICINE

## 2024-02-27 PROCEDURE — 250N000013 HC RX MED GY IP 250 OP 250 PS 637: Performed by: STUDENT IN AN ORGANIZED HEALTH CARE EDUCATION/TRAINING PROGRAM

## 2024-02-27 PROCEDURE — 250N000013 HC RX MED GY IP 250 OP 250 PS 637: Performed by: HOSPITALIST

## 2024-02-27 PROCEDURE — 99239 HOSP IP/OBS DSCHRG MGMT >30: CPT | Performed by: STUDENT IN AN ORGANIZED HEALTH CARE EDUCATION/TRAINING PROGRAM

## 2024-02-27 PROCEDURE — 97116 GAIT TRAINING THERAPY: CPT | Mod: GP

## 2024-02-27 PROCEDURE — 97530 THERAPEUTIC ACTIVITIES: CPT | Mod: GP

## 2024-02-27 RX ORDER — DIPHENHYDRAMINE HYDROCHLORIDE AND LIDOCAINE HYDROCHLORIDE AND ALUMINUM HYDROXIDE AND MAGNESIUM HYDRO
10 KIT EVERY 6 HOURS PRN
Qty: 119 ML | Refills: 0 | Status: SHIPPED | OUTPATIENT
Start: 2024-02-27

## 2024-02-27 RX ORDER — LIDOCAINE 4 G/G
1 PATCH TOPICAL EVERY 24 HOURS
Qty: 12 PATCH | Refills: 0 | Status: SHIPPED | OUTPATIENT
Start: 2024-02-27

## 2024-02-27 RX ORDER — LEVOFLOXACIN 250 MG/1
250 TABLET, FILM COATED ORAL EVERY OTHER DAY
Qty: 2 TABLET | Refills: 0 | Status: SHIPPED | OUTPATIENT
Start: 2024-02-27

## 2024-02-27 RX ADMIN — DOCUSATE SODIUM 100 MG: 100 CAPSULE, LIQUID FILLED ORAL at 08:06

## 2024-02-27 RX ADMIN — Medication 1 TABLET: at 08:06

## 2024-02-27 RX ADMIN — LIDOCAINE 1 PATCH: 4 PATCH TOPICAL at 08:06

## 2024-02-27 RX ADMIN — BENZOCAINE: 0.1 GEL TOPICAL at 08:18

## 2024-02-27 RX ADMIN — Medication 1 DROP: at 08:36

## 2024-02-27 RX ADMIN — ACETAMINOPHEN 650 MG: 325 TABLET ORAL at 08:27

## 2024-02-27 RX ADMIN — GABAPENTIN 100 MG: 100 CAPSULE ORAL at 08:06

## 2024-02-27 RX ADMIN — METOCLOPRAMIDE HYDROCHLORIDE 5 MG: 5 SOLUTION ORAL at 08:06

## 2024-02-27 RX ADMIN — SENNOSIDES AND DOCUSATE SODIUM 2 TABLET: 8.6; 5 TABLET ORAL at 08:05

## 2024-02-27 ASSESSMENT — ACTIVITIES OF DAILY LIVING (ADL)
ADLS_ACUITY_SCORE: 35

## 2024-02-27 NOTE — PLAN OF CARE
Physical Therapy Discharge Summary    Reason for therapy discharge:    Discharged to home with hospice    Progress towards therapy goal(s). See goals on Care Plan in [facility] electronic health record for goal details.  Goals partially met.  Barriers to achieving goals:   discharge from facility.    Therapy recommendation(s):    Continued therapy is recommended.  Rationale/Recommendations:  continued PT with home PT.  Continue home exercise program.

## 2024-02-27 NOTE — PLAN OF CARE
Goal Outcome Evaluation:    Patient discharged home on hospice with daughterNarda.  All discharge paperwork discussed with patient and daughter and all personal belongings left with them.

## 2024-02-27 NOTE — PLAN OF CARE
Problem: Adult Inpatient Plan of Care  Goal: Absence of Hospital-Acquired Illness or Injury  Outcome: Progressing   Goal Outcome Evaluation:         Hospice nurse to come tomorrow, will go home with Daughter Narda tomorrow

## 2024-02-27 NOTE — PLAN OF CARE
Problem: Adult Inpatient Plan of Care  Goal: Plan of Care Review  Description: The Plan of Care Review/Shift note should be completed every shift.  The Outcome Evaluation is a brief statement about your assessment that the patient is improving, declining, or no change.  This information will be displayed automatically on your shift  note.  Outcome: Adequate for Care Transition   Goal Outcome Evaluation:  VSS and afebrile. No complaints of pain overnight. Hopeful for discharge with daughter today.

## 2024-02-27 NOTE — PROGRESS NOTES
Care Management Discharge Note    Discharge Date: 02/27/2024       Discharge Disposition: Hospice    Discharge Services: None    Discharge DME: None    Discharge Transportation: family or friend will provide    Private pay costs discussed: Not applicable    Does the patient's insurance plan have a 3 day qualifying hospital stay waiver?  No    PAS Confirmation Code:  N/A    Patient/family educated on Medicare website which has current facility and service quality ratings: yes    Education Provided on the Discharge Plan: Yes  Persons Notified of Discharge Plans: Pt, daughter, grandson and [facility]   Patient/Family in Agreement with the Plan: yes    Handoff Referral Completed: Yes    Additional Information:  Pt discharging home today with [facility]. Daughter is transporting. [facility] is meeting to sign Pt on at Noon today at her apartment.  [facility] in touch with Grandson and daughter.  Discharge orders faxed to Hospice.     STEVO Curtis

## 2024-02-27 NOTE — PROGRESS NOTES
Occupational Therapy Discharge Summary    Reason for therapy discharge:    Discharged to home.    Progress towards therapy goal(s). See goals on Care Plan in [facility] electronic health record for goal details.  Goals partially met.  Barriers to achieving goals:   discharge from facility.    Therapy recommendation(s):    No further therapy is recommended.  Home with Hospice Care.  Pt will need 24 hour supervision.

## 2024-02-27 NOTE — CONSULTS
Kim is discharging home today. She is happy about this and adds that the care in the hospital has been good.    I provided encouragement, affirmation of theo and a Godspeed prayer.    JADE Moore.  Palliative Care Team

## 2024-02-27 NOTE — PLAN OF CARE
Speech Language Therapy Discharge Summary    Reason for therapy discharge:    No further expectations of functional progress.    Progress towards therapy goal(s). See goals on Care Plan in [facility] electronic health record for goal details.  Goals not met.  Barriers to achieving goals:   Acute on chronic esophageal dysphagia. Patient with minimal tolerance for PO intake.    Therapy recommendation(s):    No further therapy is recommended.  Plan is to discharge home with hospice. Diet has been advanced to Full Liquid by physician. Patient may introduce soft solids as tolerated upon return to home. She should sit fully upright for all intake as well as for 30-60 minutes thereafter. Patient to also eat slowly, take small bites/sips, and alternate bite with sip(s).

## 2024-02-28 NOTE — DISCHARGE SUMMARY
[facility]  Hospitalist Discharge Summary      Date of Admission:  2/20/2024  Date of Discharge:  2/27/2024 12:09 PM  Discharging Provider: Vitaly Nina MD  Discharge Service: Hospitalist Service    Discharge Diagnoses   Aspiration pneumonia  Hypertension  CKD  Esophageal dysphagia  Hypokalemia  Malnutrition    Clinically Significant Risk Factors     # Severe Malnutrition: based on nutrition assessment      Follow-ups Needed After Discharge   Follow-up Appointments     Follow-up and recommended labs and tests       Follow up with hospice team        {Additional follow-up instructions/to-do's for PCP        Unresulted Labs Ordered in the Past 30 Days of this Admission       No orders found from 1/21/2024 to 2/21/2024.        These results will be followed up by N/A    Discharge Disposition   Discharged to home with hospice  Condition at discharge: Terminal    Hospital Course       89-year-old woman with past medical history of advanced achalasia with acid reflux, esophageal diverticulum status post multiple Botox injections last 1 being in January 2024, hypertension, DNR/DNI status, CKD stage III  Patient was admitted with complaints of oral intake being compromised and concerns of aspiration pneumonia.  She underwent EGD on the 22nd of this month which revealed Zenker's diverticulum in the middle of the esophagus and had another Botox injection for the same  Conversations about palliative care versus hospice have been ongoing.  1/.  Recurrent dysphagia longstanding problem-Recent botox, family and patient would not like further evaluation or restrictions, home on hospice  2/.  Hypertension: Patient was noted to be initially hypertensive but over the last several days her blood pressure has been on the lower side family agreed to discontinue them on hospice  3/.  Esophageal stricture and Zenker's diverticulum: No evidence of malignancy on pathology.  4/.  CODE STATUS DNR/DNI  5/.   Aspiration pneumonia: Discontinue IV antibiotics switch her over to levofloxacin for additional 3 doses.  Patient's family members and I(Dr. Grace) had an extended conversation in presence of the bedside nursing and we came to the conclusion that the patient should be permitted to eat, video swallow is not likely to change any outcomes and patient does not have any other intervention able diseases.  Palliative care to consult on patient likely discharge home with the same.    Malnutrition:    - Level of malnutrition: Severe    -Ensure enlive and RD followed while inpatient.     Ultimately elected to enroll in hospice, patient was not in any pain or anxiety and family requested that she not be given the history of pain and anxiety that are controlled substances she [place] needs them she has a history of adverse strong reactions per grandson.  She was discharged to finish her course of Levaquin given concerns for aspiration pneumonia.  Eating for comfort discharged home with [facility] hospice.    Consultations This Hospital Stay   SPEECH LANGUAGE PATH ADULT IP CONSULT  NUTRITION SERVICES ADULT IP CONSULT  GASTROENTEROLOGY IP CONSULT  PHYSICAL THERAPY ADULT IP CONSULT  OCCUPATIONAL THERAPY ADULT IP CONSULT  NUTRITION SERVICES ADULT IP CONSULT  PALLIATIVE CARE ADULT IP CONSULT  CARE MANAGEMENT / SOCIAL WORK IP CONSULT  CARE MANAGEMENT / SOCIAL WORK IP CONSULT  SPEECH LANGUAGE PATH ADULT IP CONSULT  SPEECH LANGUAGE PATH ADULT IP CONSULT  SPIRITUAL HEALTH SERVICES IP CONSULT    Code Status   Prior    Time Spent on this Encounter   I, Vitaly Nina MD, personally saw the patient today and spent greater than 30 minutes discharging this patient.       Vitaly Nina MD  [address] [address]  Phone: [phone]  Fax: [phone]  ______________________________________________________________________    Physical Exam   Vital Signs: Temp: 98.1  F  (36.7  C) Temp src: Oral BP: 123/60 Pulse: 61   Resp: 16 SpO2: 98 % O2 Device: None (Room air)    Weight: 85 lbs 0 oz  Gen: Appears well, NAD, on RA  Card:Warm well perfused, pulses assumed patient talking  Pulm: Normal I/E effort on RA  Abd:Non distended  Skin:No obvious rashes or lesions on exposed areas of skin  Neuro AxOx4, S/S grossly intact, no obvious FND,   Psych:Pleasant, answering questions appropriately, insight good, judgement good, does not appear to be responding to I/E stimuli        Primary Care Physician   Braulio Chirinos    Discharge Orders      Reason for your hospital stay    Aspiration pneumonia, difficulty swallowing, ultimately transitioned to comfort care     Follow-up and recommended labs and tests     Follow up with hospice team     Activity    Your activity upon discharge: activity as tolerated     Diet    Follow this diet upon discharge: Orders Placed This Encounter      Snacks/Supplements Adult: Ensure Enlive; With Meals      Full Liquid Diet       Significant Results and Procedures   Most Recent 3 CBC's:  Recent Labs   Lab Test 02/24/24  0626 02/22/24  0540 02/21/24  0546   WBC 6.1 7.3 6.4   HGB 9.1* 9.9* 10.4*   MCV 92 92 92    231 236       Discharge Medications   Discharge Medication List as of 2/27/2024 11:33 AM        START taking these medications    Details   levofloxacin (LEVAQUIN) 250 MG tablet Take 1 tablet (250 mg) by mouth every other day, Disp-2 tablet, R-0, E-Prescribe      Lidocaine (LIDOCARE) 4 % Patch Place 1 patch onto the skin every 24 hours To prevent lidocaine toxicity, patient should be patch free for 12 hrs daily.Disp-12 patch, [id number]      magic mouthwash suspension, diphenhydrAMINE, lidocaine, aluminum-magnesium & simethicone, (FIRST-MOUTHWASH BLM) compounding kit Swish and swallow 10 mLs in mouth every 6 hours as needed for mouth sores, Disp-119 mL, R-0, E-Prescribe           CONTINUE these medications which have NOT CHANGED    Details   docusate  sodium (COLACE) 100 MG tablet Take 100 mg by mouth daily, Historical      gabapentin (NEURONTIN) 100 MG capsule Take 100 mg by mouth 2 times daily, Historical           STOP taking these medications       amLODIPine (NORVASC) 5 MG tablet Comments:   Reason for Stopping:         calcium carbonate-vitamin D (CALTRATE) 600-10 MG-MCG per tablet Comments:   Reason for Stopping:         denosumab 60 mg/mL Syrg Comments:   Reason for Stopping:         losartan (COZAAR) 50 MG tablet Comments:   Reason for Stopping:         omeprazole (PRILOSEC) 20 MG DR capsule Comments:   Reason for Stopping:             Allergies   Allergies   Allergen Reactions    Penicillins Hives    Seafood     Tetracycline Unknown

## (undated) DEVICE — TUBING SUCTION MEDI-VAC 1/4"X20' N620A

## (undated) DEVICE — FORCEP BIOPSY 2.3MM DISP COATED 000388

## (undated) DEVICE — NDL SCLEROTHERAPY 25GA CARR-LOCK  00711811

## (undated) DEVICE — SUCTION MANIFOLD NEPTUNE 2 SYS 1 PORT 702-025-000

## (undated) DEVICE — SOL WATER IRRIG 1000ML BOTTLE 2F7114

## (undated) DEVICE — TUBING SUCTION MEDI-VAC 1/4"X20' N620A - HE